# Patient Record
Sex: FEMALE | Race: WHITE
[De-identification: names, ages, dates, MRNs, and addresses within clinical notes are randomized per-mention and may not be internally consistent; named-entity substitution may affect disease eponyms.]

---

## 2017-02-07 ENCOUNTER — CHARTING TRANS (OUTPATIENT)
Dept: OTHER | Age: 57
End: 2017-02-07

## 2017-02-07 ASSESSMENT — PAIN SCALES - GENERAL: PAINLEVEL_OUTOF10: 0

## 2017-03-17 PROCEDURE — 87086 URINE CULTURE/COLONY COUNT: CPT | Performed by: FAMILY MEDICINE

## 2017-08-07 PROCEDURE — 87086 URINE CULTURE/COLONY COUNT: CPT | Performed by: FAMILY MEDICINE

## 2017-11-22 PROBLEM — J45.20 MILD INTERMITTENT ASTHMA WITHOUT COMPLICATION (HCC): Status: ACTIVE | Noted: 2017-11-22

## 2017-11-22 PROBLEM — J45.20 MILD INTERMITTENT ASTHMA WITHOUT COMPLICATION: Status: ACTIVE | Noted: 2017-11-22

## 2018-01-16 ENCOUNTER — APPOINTMENT (OUTPATIENT)
Dept: LAB | Age: 58
End: 2018-01-16
Attending: DERMATOLOGY
Payer: COMMERCIAL

## 2018-01-16 PROCEDURE — 87186 SC STD MICRODIL/AGAR DIL: CPT | Performed by: DERMATOLOGY

## 2018-01-16 PROCEDURE — 87205 SMEAR GRAM STAIN: CPT | Performed by: DERMATOLOGY

## 2018-01-16 PROCEDURE — 87070 CULTURE OTHR SPECIMN AEROBIC: CPT | Performed by: DERMATOLOGY

## 2018-01-16 PROCEDURE — 87147 CULTURE TYPE IMMUNOLOGIC: CPT | Performed by: DERMATOLOGY

## 2018-02-05 ENCOUNTER — HOSPITAL ENCOUNTER (EMERGENCY)
Facility: HOSPITAL | Age: 58
Discharge: HOME | End: 2018-02-05
Payer: COMMERCIAL

## 2018-02-05 ENCOUNTER — APPOINTMENT (OUTPATIENT)
Dept: ULTRASOUND IMAGING | Facility: HOSPITAL | Age: 58
End: 2018-02-05
Attending: PHYSICIAN ASSISTANT
Payer: COMMERCIAL

## 2018-02-05 ENCOUNTER — APPOINTMENT (OUTPATIENT)
Dept: CT IMAGING | Facility: HOSPITAL | Age: 58
End: 2018-02-05
Attending: PHYSICIAN ASSISTANT
Payer: COMMERCIAL

## 2018-02-05 ENCOUNTER — APPOINTMENT (OUTPATIENT)
Dept: GENERAL RADIOLOGY | Facility: HOSPITAL | Age: 58
End: 2018-02-05
Payer: COMMERCIAL

## 2018-02-05 VITALS
DIASTOLIC BLOOD PRESSURE: 77 MMHG | SYSTOLIC BLOOD PRESSURE: 130 MMHG | BODY MASS INDEX: 28.35 KG/M2 | HEART RATE: 65 BPM | RESPIRATION RATE: 16 BRPM | OXYGEN SATURATION: 98 % | WEIGHT: 160 LBS | TEMPERATURE: 98 F | HEIGHT: 63 IN

## 2018-02-05 DIAGNOSIS — M71.22 POPLITEAL CYST, LEFT: ICD-10-CM

## 2018-02-05 DIAGNOSIS — R07.9 CHEST PAIN OF UNCERTAIN ETIOLOGY: Primary | ICD-10-CM

## 2018-02-05 LAB
ALBUMIN SERPL-MCNC: 3.8 G/DL (ref 3.5–4.8)
ALP LIVER SERPL-CCNC: 76 U/L (ref 41–108)
ALT SERPL-CCNC: 34 U/L (ref 14–54)
AST SERPL-CCNC: 19 U/L (ref 15–41)
ATRIAL RATE: 63 BPM
ATRIAL RATE: 76 BPM
BASOPHILS # BLD AUTO: 0.07 X10(3) UL (ref 0–0.1)
BASOPHILS NFR BLD AUTO: 1.1 %
BILIRUB SERPL-MCNC: 0.5 MG/DL (ref 0.1–2)
BUN BLD-MCNC: 11 MG/DL (ref 8–20)
CALCIUM BLD-MCNC: 9.3 MG/DL (ref 8.3–10.3)
CHLORIDE: 107 MMOL/L (ref 101–111)
CO2: 26 MMOL/L (ref 22–32)
CREAT BLD-MCNC: 0.67 MG/DL (ref 0.55–1.02)
EOSINOPHIL # BLD AUTO: 0.2 X10(3) UL (ref 0–0.3)
EOSINOPHIL NFR BLD AUTO: 3 %
ERYTHROCYTE [DISTWIDTH] IN BLOOD BY AUTOMATED COUNT: 12.6 % (ref 11.5–16)
GLUCOSE BLD-MCNC: 101 MG/DL (ref 70–99)
HCT VFR BLD AUTO: 40.1 % (ref 34–50)
HGB BLD-MCNC: 13.3 G/DL (ref 12–16)
IMMATURE GRANULOCYTE COUNT: 0.04 X10(3) UL (ref 0–1)
IMMATURE GRANULOCYTE RATIO %: 0.6 %
LYMPHOCYTES # BLD AUTO: 2.59 X10(3) UL (ref 0.9–4)
LYMPHOCYTES NFR BLD AUTO: 39.2 %
M PROTEIN MFR SERPL ELPH: 7.4 G/DL (ref 6.1–8.3)
MCH RBC QN AUTO: 31.9 PG (ref 27–33.2)
MCHC RBC AUTO-ENTMCNC: 33.2 G/DL (ref 31–37)
MCV RBC AUTO: 96.2 FL (ref 81–100)
MONOCYTES # BLD AUTO: 0.6 X10(3) UL (ref 0.1–0.6)
MONOCYTES NFR BLD AUTO: 9.1 %
NEUTROPHIL ABS PRELIM: 3.11 X10 (3) UL (ref 1.3–6.7)
NEUTROPHILS # BLD AUTO: 3.11 X10(3) UL (ref 1.3–6.7)
NEUTROPHILS NFR BLD AUTO: 47 %
P AXIS: 44 DEGREES
P AXIS: 45 DEGREES
P-R INTERVAL: 144 MS
P-R INTERVAL: 154 MS
PLATELET # BLD AUTO: 351 10(3)UL (ref 150–450)
POTASSIUM SERPL-SCNC: 3.1 MMOL/L (ref 3.6–5.1)
Q-T INTERVAL: 394 MS
Q-T INTERVAL: 444 MS
QRS DURATION: 88 MS
QRS DURATION: 88 MS
QTC CALCULATION (BEZET): 443 MS
QTC CALCULATION (BEZET): 454 MS
R AXIS: 56 DEGREES
R AXIS: 62 DEGREES
RBC # BLD AUTO: 4.17 X10(6)UL (ref 3.8–5.1)
RED CELL DISTRIBUTION WIDTH-SD: 44.3 FL (ref 35.1–46.3)
SODIUM SERPL-SCNC: 141 MMOL/L (ref 136–144)
T AXIS: 54 DEGREES
T AXIS: 59 DEGREES
TROPONIN: <0.046 NG/ML (ref ?–0.05)
TROPONIN: <0.046 NG/ML (ref ?–0.05)
VENTRICULAR RATE: 63 BPM
VENTRICULAR RATE: 76 BPM
WBC # BLD AUTO: 6.6 X10(3) UL (ref 4–13)

## 2018-02-05 PROCEDURE — 99285 EMERGENCY DEPT VISIT HI MDM: CPT

## 2018-02-05 PROCEDURE — 93010 ELECTROCARDIOGRAM REPORT: CPT

## 2018-02-05 PROCEDURE — 84484 ASSAY OF TROPONIN QUANT: CPT | Performed by: PHYSICIAN ASSISTANT

## 2018-02-05 PROCEDURE — 80053 COMPREHEN METABOLIC PANEL: CPT

## 2018-02-05 PROCEDURE — 71045 X-RAY EXAM CHEST 1 VIEW: CPT

## 2018-02-05 PROCEDURE — 84484 ASSAY OF TROPONIN QUANT: CPT

## 2018-02-05 PROCEDURE — 36415 COLL VENOUS BLD VENIPUNCTURE: CPT

## 2018-02-05 PROCEDURE — 93005 ELECTROCARDIOGRAM TRACING: CPT

## 2018-02-05 PROCEDURE — 85025 COMPLETE CBC W/AUTO DIFF WBC: CPT

## 2018-02-05 PROCEDURE — 93971 EXTREMITY STUDY: CPT | Performed by: PHYSICIAN ASSISTANT

## 2018-02-05 PROCEDURE — 71275 CT ANGIOGRAPHY CHEST: CPT | Performed by: PHYSICIAN ASSISTANT

## 2018-02-05 RX ORDER — ASPIRIN 81 MG/1
324 TABLET, CHEWABLE ORAL ONCE
Status: COMPLETED | OUTPATIENT
Start: 2018-02-05 | End: 2018-02-05

## 2018-02-05 NOTE — ED NOTES
Pt lying on cart with eyes closed, appears comfortable. Pt denies complaints at this time. Pt is aware she is waiting for CT. Spouse at bedside.

## 2018-02-05 NOTE — ED PROVIDER NOTES
Patient Seen in: [facility] Emergency Department    History   Patient presents with:  Chest Pain Angina (cardiovascular)    Stated Complaint: chest pain    HPI    Chiara Brumfield is a 68-year-old female who comes in today with her  complaining of left- POSSIBLE POLYPECTOMY [id number];  Surgeon: Antonia Morgan MD;  Location: [address] [place] [address]  2006: CT HEART W/ CALCIUM SCORING      Comment: 0  1/13/2011: NEEDLE BIOPSY RIGHT  2012: STRESS ECHO TEST, CARDIO ([facility])      Com reflexes are normal   Left knee: + pain will ROM, no effusion or edema, negative anterior drawer and laxity, good distal pulses          ED Course     Labs Reviewed   COMP METABOLIC PANEL (14) - Abnormal; Notable for the following:        Result Value    G Technologist)  Patient has chest pain last night and soreness to the chest today. CONTRAST USED:  61cc of Omnipaque 350  FINDINGS:  VASCULATURE:  No visible pulmonary arterial thrombus or attenuation. THORACIC AORTA:  Mildly tortuous.   No aneurysm or di vein.  PATIENT STATED HISTORY: (As transcribed by Technologist)  Patient offered no additional history at this time. FINDINGS:  EXTREMITY EXAMINED:  Left lower extremity THROMBI:  None visible.  COMPRESSION:  Normal compressibility, phasicity, and augmen discharge today.       Disposition and Plan     Clinical Impression:  Chest pain of uncertain etiology  (primary encounter diagnosis)  Popliteal cyst, left    Disposition:  Discharge  2/5/2018  4:22 pm    Follow-up:  Florencia Gonzalez MD  [address] [place] [address]

## 2018-02-07 PROBLEM — L73.9 CHRONIC FOLLICULITIS: Status: ACTIVE | Noted: 2018-02-07

## 2018-02-08 PROCEDURE — 87502 INFLUENZA DNA AMP PROBE: CPT | Performed by: NURSE PRACTITIONER

## 2018-02-08 PROCEDURE — 87798 DETECT AGENT NOS DNA AMP: CPT | Performed by: NURSE PRACTITIONER

## 2018-03-02 PROBLEM — Z80.0 FAMILY HISTORY OF COLON CANCER: Status: ACTIVE | Noted: 2018-03-02

## 2018-03-02 PROCEDURE — 81001 URINALYSIS AUTO W/SCOPE: CPT | Performed by: FAMILY MEDICINE

## 2018-10-04 ENCOUNTER — CHARTING TRANS (OUTPATIENT)
Dept: OTHER | Age: 58
End: 2018-10-04

## 2018-10-04 ENCOUNTER — LAB SERVICES (OUTPATIENT)
Dept: OTHER | Age: 58
End: 2018-10-04

## 2018-10-04 LAB — RAPID STREP GROUP A: NORMAL

## 2018-11-27 VITALS
HEIGHT: 63 IN | RESPIRATION RATE: 16 BRPM | SYSTOLIC BLOOD PRESSURE: 126 MMHG | BODY MASS INDEX: 29.94 KG/M2 | WEIGHT: 168.98 LBS | TEMPERATURE: 98.2 F | DIASTOLIC BLOOD PRESSURE: 78 MMHG | HEART RATE: 80 BPM

## 2018-12-02 VITALS
RESPIRATION RATE: 18 BRPM | SYSTOLIC BLOOD PRESSURE: 132 MMHG | HEART RATE: 98 BPM | DIASTOLIC BLOOD PRESSURE: 70 MMHG | BODY MASS INDEX: 29.02 KG/M2 | TEMPERATURE: 98.5 F | HEIGHT: 64 IN | WEIGHT: 170 LBS

## 2021-09-13 ENCOUNTER — OFFICE VISIT (OUTPATIENT)
Dept: URGENT CARE | Age: 61
End: 2021-09-13

## 2021-09-13 VITALS
DIASTOLIC BLOOD PRESSURE: 78 MMHG | TEMPERATURE: 98.3 F | HEIGHT: 64 IN | WEIGHT: 170 LBS | BODY MASS INDEX: 29.02 KG/M2 | SYSTOLIC BLOOD PRESSURE: 132 MMHG | OXYGEN SATURATION: 95 % | HEART RATE: 89 BPM

## 2021-09-13 DIAGNOSIS — J02.9 SORE THROAT: Primary | ICD-10-CM

## 2021-09-13 DIAGNOSIS — J06.9 ACUTE UPPER RESPIRATORY INFECTION: ICD-10-CM

## 2021-09-13 DIAGNOSIS — R05.9 COUGH: ICD-10-CM

## 2021-09-13 LAB
INTERNAL PROCEDURAL CONTROLS ACCEPTABLE: YES
S PYO AG THROAT QL IA.RAPID: NEGATIVE
SARS-COV+SARS-COV-2 AG RESP QL IA.RAPID: NOT DETECTED

## 2021-09-13 PROCEDURE — 99213 OFFICE O/P EST LOW 20 MIN: CPT | Performed by: NURSE PRACTITIONER

## 2021-09-13 PROCEDURE — 87426 SARSCOV CORONAVIRUS AG IA: CPT | Performed by: NURSE PRACTITIONER

## 2021-09-13 PROCEDURE — 87880 STREP A ASSAY W/OPTIC: CPT | Performed by: NURSE PRACTITIONER

## 2021-09-13 RX ORDER — SERTRALINE HYDROCHLORIDE 100 MG/1
100 TABLET, FILM COATED ORAL
COMMUNITY
Start: 2021-08-30

## 2021-09-13 RX ORDER — ALBUTEROL SULFATE 90 UG/1
2 AEROSOL, METERED RESPIRATORY (INHALATION) PRN
COMMUNITY
Start: 2021-07-13

## 2021-09-13 RX ORDER — NAPROXEN 500 MG/1
TABLET ORAL
COMMUNITY
Start: 2021-05-26

## 2021-09-13 RX ORDER — FOLIC ACID 1 MG/1
1000 TABLET ORAL DAILY
COMMUNITY
Start: 2021-08-24

## 2021-09-13 RX ORDER — BENZONATATE 100 MG/1
100 CAPSULE ORAL 3 TIMES DAILY PRN
Qty: 20 CAPSULE | Refills: 0 | Status: SHIPPED | OUTPATIENT
Start: 2021-09-13 | End: 2021-10-28 | Stop reason: SDUPTHER

## 2021-09-13 RX ORDER — DEXTROAMPHETAMINE SACCHARATE, AMPHETAMINE ASPARTATE, DEXTROAMPHETAMINE SULFATE AND AMPHETAMINE SULFATE 3.75; 3.75; 3.75; 3.75 MG/1; MG/1; MG/1; MG/1
15 TABLET ORAL
COMMUNITY
Start: 2021-08-30 | End: 2021-10-28 | Stop reason: SDUPTHER

## 2021-09-13 RX ORDER — METHOTREXATE 2.5 MG/1
TABLET ORAL
COMMUNITY
Start: 2021-06-02

## 2021-09-13 RX ORDER — PANTOPRAZOLE SODIUM 40 MG/1
40 TABLET, DELAYED RELEASE ORAL
COMMUNITY
Start: 2021-06-01

## 2021-09-13 ASSESSMENT — ENCOUNTER SYMPTOMS
SINUS PRESSURE: 0
TROUBLE SWALLOWING: 0
VOMITING: 0
COUGH: 1
FATIGUE: 0
NAUSEA: 0
HEADACHES: 1
RHINORRHEA: 1
SORE THROAT: 1
FEVER: 0
CHEST TIGHTNESS: 0
CHILLS: 0
DIARRHEA: 0
DIZZINESS: 0
VOICE CHANGE: 0
LIGHT-HEADEDNESS: 0
SHORTNESS OF BREATH: 0

## 2021-09-14 PROCEDURE — U0005 INFEC AGEN DETEC AMPLI PROBE: HCPCS | Performed by: NURSE PRACTITIONER

## 2021-09-14 PROCEDURE — U0003 INFECTIOUS AGENT DETECTION BY NUCLEIC ACID (DNA OR RNA); SEVERE ACUTE RESPIRATORY SYNDROME CORONAVIRUS 2 (SARS-COV-2) (CORONAVIRUS DISEASE [COVID-19]), AMPLIFIED PROBE TECHNIQUE, MAKING USE OF HIGH THROUGHPUT TECHNOLOGIES AS DESCRIBED BY CMS-2020-01-R: HCPCS | Performed by: NURSE PRACTITIONER

## 2021-09-15 LAB
SARS-COV-2 RNA RESP QL NAA+PROBE: NOT DETECTED
SERVICE CMNT-IMP: NORMAL
SERVICE CMNT-IMP: NORMAL

## 2021-09-17 ENCOUNTER — TELEPHONE (OUTPATIENT)
Dept: SCHEDULING | Age: 61
End: 2021-09-17

## 2021-09-18 ENCOUNTER — WALK IN (OUTPATIENT)
Dept: URGENT CARE | Age: 61
End: 2021-09-18

## 2021-09-18 VITALS
OXYGEN SATURATION: 99 % | HEART RATE: 84 BPM | HEIGHT: 64 IN | SYSTOLIC BLOOD PRESSURE: 124 MMHG | BODY MASS INDEX: 29.02 KG/M2 | DIASTOLIC BLOOD PRESSURE: 74 MMHG | RESPIRATION RATE: 18 BRPM | TEMPERATURE: 98.5 F | WEIGHT: 170 LBS

## 2021-09-18 DIAGNOSIS — J01.10 ACUTE FRONTAL SINUSITIS, RECURRENCE NOT SPECIFIED: Primary | ICD-10-CM

## 2021-09-18 PROCEDURE — 99213 OFFICE O/P EST LOW 20 MIN: CPT | Performed by: NURSE PRACTITIONER

## 2021-09-18 RX ORDER — LIDOCAINE HYDROCHLORIDE 20 MG/ML
SOLUTION OROPHARYNGEAL
Qty: 120 ML | Refills: 0 | Status: SHIPPED | OUTPATIENT
Start: 2021-09-18 | End: 2021-10-28 | Stop reason: SDUPTHER

## 2021-09-18 RX ORDER — LIDOCAINE HYDROCHLORIDE 20 MG/ML
SOLUTION OROPHARYNGEAL
Qty: 120 ML | Refills: 0 | Status: SHIPPED | OUTPATIENT
Start: 2021-09-18

## 2021-09-18 RX ORDER — AMOXICILLIN AND CLAVULANATE POTASSIUM 875; 125 MG/1; MG/1
1 TABLET, FILM COATED ORAL EVERY 12 HOURS
Qty: 20 TABLET | Refills: 0 | Status: SHIPPED | OUTPATIENT
Start: 2021-09-18 | End: 2021-09-28

## 2021-09-18 RX ORDER — METHYLPREDNISOLONE 4 MG
TABLET, DOSE PACK ORAL
Qty: 21 TABLET | Refills: 0 | Status: SHIPPED | OUTPATIENT
Start: 2021-09-18 | End: 2021-10-28 | Stop reason: SDUPTHER

## 2021-09-18 ASSESSMENT — ENCOUNTER SYMPTOMS
DIZZINESS: 0
HEADACHES: 1
SHORTNESS OF BREATH: 0
CHEST TIGHTNESS: 1
FEVER: 0
FACIAL SWELLING: 1
RHINORRHEA: 1
SORE THROAT: 1
LIGHT-HEADEDNESS: 0
FATIGUE: 1
VOICE CHANGE: 0
COUGH: 1
SINUS PRESSURE: 1
TROUBLE SWALLOWING: 0
CHILLS: 0

## 2021-10-28 ENCOUNTER — WALK IN (OUTPATIENT)
Dept: URGENT CARE | Age: 61
End: 2021-10-28

## 2021-10-28 VITALS
WEIGHT: 170 LBS | DIASTOLIC BLOOD PRESSURE: 74 MMHG | OXYGEN SATURATION: 100 % | RESPIRATION RATE: 20 BRPM | TEMPERATURE: 96.4 F | SYSTOLIC BLOOD PRESSURE: 120 MMHG | HEIGHT: 64 IN | HEART RATE: 85 BPM | BODY MASS INDEX: 29.02 KG/M2

## 2021-10-28 DIAGNOSIS — J45.909 BRONCHITIS WITH ASTHMA, ACUTE: Primary | ICD-10-CM

## 2021-10-28 DIAGNOSIS — J30.9 ALLERGIC RHINITIS, UNSPECIFIED SEASONALITY, UNSPECIFIED TRIGGER: ICD-10-CM

## 2021-10-28 DIAGNOSIS — J20.9 BRONCHITIS WITH ASTHMA, ACUTE: Primary | ICD-10-CM

## 2021-10-28 PROBLEM — R73.03 PREDIABETES: Status: ACTIVE | Noted: 2021-10-28

## 2021-10-28 PROBLEM — L73.9 CHRONIC FOLLICULITIS: Status: ACTIVE | Noted: 2018-02-07

## 2021-10-28 PROBLEM — R93.1 ABNORMAL CT SCAN, HEART: Status: ACTIVE | Noted: 2021-10-28

## 2021-10-28 PROBLEM — Z92.25 PERSONAL HISTORY OF IMMUNOSUPPRESSIVE THERAPY: Status: ACTIVE | Noted: 2021-10-28

## 2021-10-28 PROBLEM — J45.20 MILD INTERMITTENT ASTHMA WITHOUT COMPLICATION: Status: ACTIVE | Noted: 2017-11-22

## 2021-10-28 PROBLEM — I10 ESSENTIAL HYPERTENSION: Status: ACTIVE | Noted: 2021-10-28

## 2021-10-28 PROBLEM — Z80.0 FAMILY HISTORY OF COLON CANCER: Status: ACTIVE | Noted: 2018-03-02

## 2021-10-28 PROBLEM — M43.10 RETROLISTHESIS OF VERTEBRAE: Status: ACTIVE | Noted: 2021-10-28

## 2021-10-28 PROBLEM — M50.20 PROTRUSION OF CERVICAL INTERVERTEBRAL DISC: Status: ACTIVE | Noted: 2021-10-28

## 2021-10-28 LAB — SARS-COV+SARS-COV-2 AG RESP QL IA.RAPID: NOT DETECTED

## 2021-10-28 PROCEDURE — U0005 INFEC AGEN DETEC AMPLI PROBE: HCPCS | Performed by: PSYCHIATRY & NEUROLOGY

## 2021-10-28 PROCEDURE — 87426 SARSCOV CORONAVIRUS AG IA: CPT | Performed by: NURSE PRACTITIONER

## 2021-10-28 PROCEDURE — U0003 INFECTIOUS AGENT DETECTION BY NUCLEIC ACID (DNA OR RNA); SEVERE ACUTE RESPIRATORY SYNDROME CORONAVIRUS 2 (SARS-COV-2) (CORONAVIRUS DISEASE [COVID-19]), AMPLIFIED PROBE TECHNIQUE, MAKING USE OF HIGH THROUGHPUT TECHNOLOGIES AS DESCRIBED BY CMS-2020-01-R: HCPCS | Performed by: PSYCHIATRY & NEUROLOGY

## 2021-10-28 PROCEDURE — 3078F DIAST BP <80 MM HG: CPT | Performed by: NURSE PRACTITIONER

## 2021-10-28 PROCEDURE — 3074F SYST BP LT 130 MM HG: CPT | Performed by: NURSE PRACTITIONER

## 2021-10-28 PROCEDURE — 99214 OFFICE O/P EST MOD 30 MIN: CPT | Performed by: NURSE PRACTITIONER

## 2021-10-28 RX ORDER — FLUTICASONE PROPIONATE 50 MCG
2 SPRAY, SUSPENSION (ML) NASAL DAILY
Qty: 16 G | Refills: 0 | Status: SHIPPED | OUTPATIENT
Start: 2021-10-28

## 2021-10-28 RX ORDER — FLUTICASONE PROPIONATE 44 MCG
2 AEROSOL WITH ADAPTER (GRAM) INHALATION 2 TIMES DAILY
Qty: 10.6 G | Refills: 0 | Status: SHIPPED | OUTPATIENT
Start: 2021-10-28

## 2021-10-28 RX ORDER — DEXTROAMPHETAMINE SACCHARATE, AMPHETAMINE ASPARTATE, DEXTROAMPHETAMINE SULFATE AND AMPHETAMINE SULFATE 3.75; 3.75; 3.75; 3.75 MG/1; MG/1; MG/1; MG/1
15 TABLET ORAL
COMMUNITY
Start: 2021-10-31 | End: 2021-11-30

## 2021-10-29 LAB
SARS-COV-2 RNA RESP QL NAA+PROBE: NOT DETECTED
SERVICE CMNT-IMP: NORMAL
SERVICE CMNT-IMP: NORMAL

## 2023-10-12 ENCOUNTER — OFFICE VISIT (OUTPATIENT)
Dept: FAMILY MEDICINE CLINIC | Facility: CLINIC | Age: 63
End: 2023-10-12
Payer: COMMERCIAL

## 2023-10-12 ENCOUNTER — LAB ENCOUNTER (OUTPATIENT)
Dept: LAB | Age: 63
End: 2023-10-12
Attending: INTERNAL MEDICINE
Payer: COMMERCIAL

## 2023-10-12 VITALS
RESPIRATION RATE: 16 BRPM | OXYGEN SATURATION: 98 % | BODY MASS INDEX: 28.59 KG/M2 | WEIGHT: 180 LBS | SYSTOLIC BLOOD PRESSURE: 140 MMHG | DIASTOLIC BLOOD PRESSURE: 90 MMHG | HEART RATE: 72 BPM | HEIGHT: 66.5 IN

## 2023-10-12 DIAGNOSIS — J45.20 MILD INTERMITTENT ASTHMA WITHOUT COMPLICATION: ICD-10-CM

## 2023-10-12 DIAGNOSIS — Z00.00 ROUTINE GENERAL MEDICAL EXAMINATION AT A HEALTH CARE FACILITY: Primary | ICD-10-CM

## 2023-10-12 DIAGNOSIS — R53.83 FATIGUE, UNSPECIFIED TYPE: ICD-10-CM

## 2023-10-12 LAB
ALBUMIN SERPL-MCNC: 4 G/DL (ref 3.4–5)
ALBUMIN/GLOB SERPL: 1.3 {RATIO} (ref 1–2)
ALP LIVER SERPL-CCNC: 79 U/L
ALT SERPL-CCNC: 28 U/L
ANION GAP SERPL CALC-SCNC: 2 MMOL/L (ref 0–18)
AST SERPL-CCNC: 21 U/L (ref 15–37)
BILIRUB SERPL-MCNC: 0.3 MG/DL (ref 0.1–2)
BUN BLD-MCNC: 13 MG/DL (ref 7–18)
CALCIUM BLD-MCNC: 9.2 MG/DL (ref 8.5–10.1)
CHLORIDE SERPL-SCNC: 111 MMOL/L (ref 98–112)
CO2 SERPL-SCNC: 26 MMOL/L (ref 21–32)
CREAT BLD-MCNC: 0.7 MG/DL
EGFRCR SERPLBLD CKD-EPI 2021: 98 ML/MIN/1.73M2 (ref 60–?)
ESTRADIOL SERPL-MCNC: 12.6 PG/ML
FASTING STATUS PATIENT QL REPORTED: NO
GLOBULIN PLAS-MCNC: 3.2 G/DL (ref 2.8–4.4)
GLUCOSE BLD-MCNC: 105 MG/DL (ref 70–99)
IRON SATN MFR SERPL: 12 %
IRON SERPL-MCNC: 58 UG/DL
OSMOLALITY SERPL CALC.SUM OF ELEC: 288 MOSM/KG (ref 275–295)
POTASSIUM SERPL-SCNC: 3.8 MMOL/L (ref 3.5–5.1)
PROT SERPL-MCNC: 7.2 G/DL (ref 6.4–8.2)
SODIUM SERPL-SCNC: 139 MMOL/L (ref 136–145)
TIBC SERPL-MCNC: 472 UG/DL (ref 240–450)
TRANSFERRIN SERPL-MCNC: 317 MG/DL (ref 200–360)
TSI SER-ACNC: 2.03 MIU/ML (ref 0.36–3.74)
VIT B12 SERPL-MCNC: 316 PG/ML (ref 193–986)

## 2023-10-12 PROCEDURE — 82607 VITAMIN B-12: CPT | Performed by: INTERNAL MEDICINE

## 2023-10-12 PROCEDURE — 99386 PREV VISIT NEW AGE 40-64: CPT | Performed by: INTERNAL MEDICINE

## 2023-10-12 PROCEDURE — 83550 IRON BINDING TEST: CPT | Performed by: INTERNAL MEDICINE

## 2023-10-12 PROCEDURE — 82670 ASSAY OF TOTAL ESTRADIOL: CPT | Performed by: INTERNAL MEDICINE

## 2023-10-12 PROCEDURE — 36415 COLL VENOUS BLD VENIPUNCTURE: CPT | Performed by: INTERNAL MEDICINE

## 2023-10-12 PROCEDURE — 83540 ASSAY OF IRON: CPT | Performed by: INTERNAL MEDICINE

## 2023-10-12 PROCEDURE — 84630 ASSAY OF ZINC: CPT | Performed by: INTERNAL MEDICINE

## 2023-10-12 PROCEDURE — 3077F SYST BP >= 140 MM HG: CPT | Performed by: INTERNAL MEDICINE

## 2023-10-12 PROCEDURE — 80053 COMPREHEN METABOLIC PANEL: CPT | Performed by: INTERNAL MEDICINE

## 2023-10-12 PROCEDURE — 3008F BODY MASS INDEX DOCD: CPT | Performed by: INTERNAL MEDICINE

## 2023-10-12 PROCEDURE — 84443 ASSAY THYROID STIM HORMONE: CPT | Performed by: INTERNAL MEDICINE

## 2023-10-12 PROCEDURE — 3080F DIAST BP >= 90 MM HG: CPT | Performed by: INTERNAL MEDICINE

## 2023-10-12 PROCEDURE — 83036 HEMOGLOBIN GLYCOSYLATED A1C: CPT | Performed by: INTERNAL MEDICINE

## 2023-10-12 PROCEDURE — 84410 TESTOSTERONE BIOAVAILABLE: CPT | Performed by: INTERNAL MEDICINE

## 2023-10-12 RX ORDER — LIDOCAINE HYDROCHLORIDE 20 MG/ML
SOLUTION OROPHARYNGEAL
COMMUNITY
Start: 2021-09-18 | End: 2023-10-12 | Stop reason: ALTCHOICE

## 2023-10-12 RX ORDER — TACROLIMUS 1 MG/G
1 OINTMENT TOPICAL NIGHTLY
COMMUNITY
Start: 2023-06-21

## 2023-10-12 RX ORDER — DOXYCYCLINE HYCLATE 100 MG
TABLET ORAL
COMMUNITY
Start: 2023-08-08 | End: 2023-10-12 | Stop reason: ALTCHOICE

## 2023-10-12 RX ORDER — KETOCONAZOLE 20 MG/G
CREAM TOPICAL
COMMUNITY
Start: 2023-09-05 | End: 2023-10-12 | Stop reason: ALTCHOICE

## 2023-10-12 RX ORDER — ALBUTEROL SULFATE 90 UG/1
2 AEROSOL, METERED RESPIRATORY (INHALATION) AS NEEDED
COMMUNITY
Start: 2021-02-17

## 2023-10-12 RX ORDER — VENLAFAXINE HYDROCHLORIDE 150 MG/1
150 CAPSULE, EXTENDED RELEASE ORAL AS DIRECTED
COMMUNITY
Start: 2021-06-01

## 2023-10-12 RX ORDER — SECUKINUMAB 150 MG/ML
INJECTION SUBCUTANEOUS
COMMUNITY
Start: 2023-09-12

## 2023-10-12 RX ORDER — CALCIPOTRIENE AND BETAMETHASONE DIPROPIONATE 50; .5 UG/G; MG/G
AEROSOL, FOAM TOPICAL
COMMUNITY
Start: 2023-06-20 | End: 2023-10-12 | Stop reason: ALTCHOICE

## 2023-10-12 RX ORDER — METHOTREXATE 2.5 MG/1
TABLET ORAL
COMMUNITY
Start: 2021-06-02

## 2023-10-12 RX ORDER — FOLIC ACID 1 MG/1
1 TABLET ORAL DAILY
COMMUNITY
Start: 2023-05-02

## 2023-10-12 RX ORDER — DEXTROAMPHETAMINE SACCHARATE, AMPHETAMINE ASPARTATE, DEXTROAMPHETAMINE SULFATE AND AMPHETAMINE SULFATE 3.75; 3.75; 3.75; 3.75 MG/1; MG/1; MG/1; MG/1
1 TABLET ORAL 2 TIMES DAILY
COMMUNITY
Start: 2023-06-08 | End: 2023-10-12 | Stop reason: ALTCHOICE

## 2023-10-12 RX ORDER — ARIPIPRAZOLE 2 MG/1
2 TABLET ORAL
COMMUNITY
Start: 2023-01-19

## 2023-10-12 RX ORDER — FLUTICASONE PROPIONATE 44 UG/1
2 AEROSOL, METERED RESPIRATORY (INHALATION) 2 TIMES DAILY
COMMUNITY
Start: 2021-10-28

## 2023-10-12 RX ORDER — HYDROXYZINE HYDROCHLORIDE 25 MG/1
25 TABLET, FILM COATED ORAL NIGHTLY PRN
COMMUNITY
Start: 2023-05-23 | End: 2023-10-12 | Stop reason: ALTCHOICE

## 2023-10-14 LAB — HGBA1C: 5.7 %

## 2023-10-19 LAB
SEX HORM BIND GLOB: 43.1 NMOL/L
TESTOST % FREE+WEAK BND: 12.9 %
TESTOST FREE+WEAK BND: 2.2 NG/DL
TESTOSTERONE TOT /MS: 16.7 NG/DL

## 2023-10-25 LAB — ZINC: 67 UG/DL

## 2023-10-30 ENCOUNTER — PATIENT MESSAGE (OUTPATIENT)
Dept: FAMILY MEDICINE CLINIC | Facility: CLINIC | Age: 63
End: 2023-10-30

## 2023-10-30 DIAGNOSIS — F32.A DEPRESSION, UNSPECIFIED DEPRESSION TYPE: ICD-10-CM

## 2023-10-30 DIAGNOSIS — F41.9 ANXIETY: ICD-10-CM

## 2023-10-30 NOTE — TELEPHONE ENCOUNTER
From: Oliverio Pedraza  To: Geo Ross  Sent: 10/30/2023 3:33 PM CDT  Subject: Meds    Hi Doctor C.,  I will be a snowbird this year. Can you refill my sertraline at 3 months script? We are leaving on November 15th.

## 2023-10-31 RX ORDER — SERTRALINE HYDROCHLORIDE 100 MG/1
50 TABLET, FILM COATED ORAL DAILY
Qty: 45 TABLET | Refills: 0 | Status: SHIPPED | OUTPATIENT
Start: 2023-10-31

## 2024-02-04 DIAGNOSIS — F41.9 ANXIETY: ICD-10-CM

## 2024-02-04 DIAGNOSIS — F32.A DEPRESSION, UNSPECIFIED DEPRESSION TYPE: ICD-10-CM

## 2024-02-05 RX ORDER — SERTRALINE HYDROCHLORIDE 100 MG/1
50 TABLET, FILM COATED ORAL DAILY
Qty: 45 TABLET | Refills: 0 | Status: SHIPPED | OUTPATIENT
Start: 2024-02-05

## 2024-02-05 NOTE — TELEPHONE ENCOUNTER
A refill request was received for:  Requested Prescriptions     Pending Prescriptions Disp Refills    SERTRALINE 100 MG Oral Tab [Pharmacy Med Name: Sertraline HCl Oral Tablet 100 MG] 45 tablet 0     Sig: TAKE 1/2 TABLET BY MOUTH EVERY DAY       Last refill date:   10-31-23    Last office visit: 10-12-23    Follow up due:  No future appointments.

## 2024-03-07 ENCOUNTER — OFFICE VISIT (OUTPATIENT)
Dept: FAMILY MEDICINE CLINIC | Facility: CLINIC | Age: 64
End: 2024-03-07
Payer: COMMERCIAL

## 2024-03-07 VITALS
BODY MASS INDEX: 26.05 KG/M2 | OXYGEN SATURATION: 100 % | RESPIRATION RATE: 16 BRPM | HEIGHT: 66.5 IN | SYSTOLIC BLOOD PRESSURE: 110 MMHG | HEART RATE: 75 BPM | DIASTOLIC BLOOD PRESSURE: 78 MMHG | WEIGHT: 164 LBS

## 2024-03-07 DIAGNOSIS — R30.0 DYSURIA: Primary | ICD-10-CM

## 2024-03-07 LAB
APPEARANCE: CLEAR
BILIRUBIN: NEGATIVE
GLUCOSE (URINE DIPSTICK): NEGATIVE MG/DL
KETONES (URINE DIPSTICK): NEGATIVE MG/DL
LEUKOCYTES: NEGATIVE
MULTISTIX LOT#: ABNORMAL NUMERIC
NITRITE, URINE: NEGATIVE
PH, URINE: 7 (ref 4.5–8)
PROTEIN (URINE DIPSTICK): NEGATIVE MG/DL
SPECIFIC GRAVITY: 1.02 (ref 1–1.03)
URINE-COLOR: YELLOW
UROBILINOGEN,SEMI-QN: 0.2 MG/DL (ref 0–1.9)

## 2024-03-07 PROCEDURE — 99213 OFFICE O/P EST LOW 20 MIN: CPT | Performed by: INTERNAL MEDICINE

## 2024-03-07 PROCEDURE — 81003 URINALYSIS AUTO W/O SCOPE: CPT | Performed by: INTERNAL MEDICINE

## 2024-03-07 RX ORDER — CEFDINIR 300 MG/1
300 CAPSULE ORAL 2 TIMES DAILY
Qty: 14 CAPSULE | Refills: 0 | Status: SHIPPED | OUTPATIENT
Start: 2024-03-07 | End: 2024-03-14

## 2024-03-07 NOTE — PROGRESS NOTES
HPI:   Monique Puentes is a 63 year old female who presents with urinary symptoms for a few days. Complains of + urinary frequency, +  urgency, no dysuria, +suprapubic pressure.   Denies back pain.  Denies hematuria.  Denies fever.    Diabetic:no  Returned from a long road trip, also had some constipation. Admits to not drinking enough water.    Current Outpatient Medications   Medication Sig Dispense Refill    SERTRALINE 100 MG Oral Tab TAKE 1/2 TABLET BY MOUTH EVERY DAY 45 tablet 0    fluticasone propionate (FLOVENT HFA) 44 MCG/ACT Inhalation Aerosol Inhale 2 puffs into the lungs 2 (two) times daily.      folic acid 1 MG Oral Tab Take 1 tablet (1 mg total) by mouth daily.      methotrexate 2.5 MG Oral Tab TAKE 6 TABLETS BY MOUTH WEEKLY ON AN EMPTY STOMACH 1 HOUR PRIOR TO MEALS OR 2 HOURS AFTER      COSENTYX SENSOREADY, 300 MG, 150 MG/ML Subcutaneous Solution Auto-injector       naproxen 500 MG Oral Tab Take 1 tablet (500 mg total) by mouth 2 (two) times daily with meals.      albuterol 108 (90 Base) MCG/ACT Inhalation Aero Soln Inhale 2 puffs into the lungs as needed. (Patient not taking: Reported on 3/7/2024)      tacrolimus 0.1 % External Ointment Apply 1 Application topically nightly. (Patient not taking: Reported on 10/12/2023)      venlafaxine  MG Oral Capsule SR 24 Hr Take 1 capsule (150 mg total) by mouth As Directed. (Patient not taking: Reported on 10/12/2023)      ARIPiprazole 2 MG Oral Tab Take 1 tablet (2 mg total) by mouth daily with food. (Patient not taking: Reported on 3/7/2024)      PANTOPRAZOLE 40 MG Oral Tab EC TAKE 1 TABLET(40 MG) BY MOUTH DAILY (Patient not taking: Reported on 3/7/2024) 90 tablet 0      Past Medical History:   Diagnosis Date    Abnormal CT scan, heart 01/01/2014    89    Asthma (HCC)     GERD     Hypercholesterolemia     HYPERLIPIDEMIA     Kidney stones     Other and unspecified hyperlipidemia 03/21/2008    OTHER DISEASES     ANKYLOSING SPONDYLOSIS    Personal history  of immunosuppressive therapy     enbrel    Prediabetes     Protrusion of cervical intervertebral disc 01/01/2017    c4-c5    PSVT (paroxysmal supraventricular tachycardia)     Pulmonary nodules 01/01/2014    4mm    Retrolisthesis of vertebrae 01/01/2017    c5-c6 and     SEASONAL ALLERGIES     Traumatic arthropathy of foot     Unspecified essential hypertension       Social History     Socioeconomic History    Marital status:    Tobacco Use    Smoking status: Never    Smokeless tobacco: Never   Substance and Sexual Activity    Alcohol use: No     Alcohol/week: 0.0 standard drinks of alcohol     Comment: 1-2 WEEKLY    Drug use: No        REVIEW OF SYSTEMS:   GENERAL HEALTH:feels well otherwise, afebrile  SKIN: denies rash  GI: denies abdominal pain;  no nausea, no vomiting  : as above;  denies unusual vaginal discharge or irritation,  denies spotting,  denies pelvic pain, no flank pain  MS:  denies myalgias    EXAM:   /78   Pulse 75   Resp 16   Ht 5' 6.5\" (1.689 m)   Wt 164 lb (74.4 kg)   LMP 03/11/2013   SpO2 100%   BMI 26.07 kg/m²   GENERAL: well developed, well nourished,in no apparent distress  SKIN: warm & dry, no rash  LUNGS: clear to auscultation, easy breathing  CV: Normal S1 S2, RRR  GI: soft, non-tender, active bowel sounds; no masses  : no suprapubic tenderness on exam,  non-distended bladder,  no CVA tenderness  Urine Dip: no nitrates, + leukocytes, + blood    ASSESSMENT AND PLAN:   Dysuria.  Prescribed  Cefdinir 300mg po bid x 7 days. Urine culture sent.  Instructions given on increasing fluid intake, bladder emptying before and after intercourse, and the prn use of AZO for bladder relief.  Follow up if symptoms persist or worsen.    Monique was given an opportunity to ask questions and verbalized understanding of care.

## 2024-03-18 RX ORDER — SERTRALINE HYDROCHLORIDE 25 MG/1
25 TABLET, FILM COATED ORAL DAILY
Qty: 90 TABLET | Refills: 1 | Status: SHIPPED | OUTPATIENT
Start: 2024-03-18

## 2024-04-24 ENCOUNTER — OFFICE VISIT (OUTPATIENT)
Dept: FAMILY MEDICINE CLINIC | Facility: CLINIC | Age: 64
End: 2024-04-24
Payer: COMMERCIAL

## 2024-04-24 VITALS
HEIGHT: 63 IN | DIASTOLIC BLOOD PRESSURE: 88 MMHG | TEMPERATURE: 97 F | OXYGEN SATURATION: 96 % | BODY MASS INDEX: 28.35 KG/M2 | RESPIRATION RATE: 18 BRPM | SYSTOLIC BLOOD PRESSURE: 120 MMHG | WEIGHT: 160 LBS | HEART RATE: 98 BPM

## 2024-04-24 DIAGNOSIS — J02.0 STREP THROAT: Primary | ICD-10-CM

## 2024-04-24 DIAGNOSIS — J02.9 SORE THROAT: ICD-10-CM

## 2024-04-24 LAB
CONTROL LINE PRESENT WITH A CLEAR BACKGROUND (YES/NO): YES YES/NO
KIT LOT #: NORMAL NUMERIC
STREP GRP A CUL-SCR: POSITIVE

## 2024-04-24 PROCEDURE — 87880 STREP A ASSAY W/OPTIC: CPT | Performed by: NURSE PRACTITIONER

## 2024-04-24 PROCEDURE — 99213 OFFICE O/P EST LOW 20 MIN: CPT | Performed by: NURSE PRACTITIONER

## 2024-04-24 RX ORDER — AMOXICILLIN 500 MG/1
500 CAPSULE ORAL 2 TIMES DAILY
Qty: 20 CAPSULE | Refills: 0 | Status: SHIPPED | OUTPATIENT
Start: 2024-04-24 | End: 2024-05-04

## 2024-04-24 NOTE — PROGRESS NOTES
Subjective:   Patient ID: Monique Puentes is a 63 year old female.    Patient is a 63 year old female who presents today with complaints of body aches, chills, tactile fevers, sore throat, headaches, ear popping, congestion and fatigue x 2 days. Denies runny nose, cough, n/v/d or abdominal pain. Decreased appetite, tolerating fluids. Attempted treatment prior to arrival = none. No ill contacts in the home. Home COVID negative yesterday.        History/Other:   Review of Systems   Constitutional:  Positive for appetite change, chills, fatigue and fever.   HENT:  Positive for congestion and sore throat. Negative for ear pain (popping/plugged) and rhinorrhea.    Respiratory:  Negative for cough.    Gastrointestinal:  Negative for abdominal pain, diarrhea, nausea and vomiting.   Musculoskeletal:  Positive for myalgias.   Neurological:  Positive for headaches.     Current Outpatient Medications   Medication Sig Dispense Refill    amoxicillin 500 MG Oral Cap Take 1 capsule (500 mg total) by mouth 2 (two) times daily for 10 days. 20 capsule 0    sertraline 25 MG Oral Tab Take 1 tablet (25 mg total) by mouth daily. 90 tablet 1    sertraline 50 MG Oral Tab Take 1 tablet (50 mg total) by mouth daily. 90 tablet 1    albuterol 108 (90 Base) MCG/ACT Inhalation Aero Soln Inhale 2 puffs into the lungs as needed. (Patient not taking: Reported on 3/7/2024)      fluticasone propionate (FLOVENT HFA) 44 MCG/ACT Inhalation Aerosol Inhale 2 puffs into the lungs 2 (two) times daily.      folic acid 1 MG Oral Tab Take 1 tablet (1 mg total) by mouth daily.      tacrolimus 0.1 % External Ointment Apply 1 Application topically nightly. (Patient not taking: Reported on 10/12/2023)      methotrexate 2.5 MG Oral Tab TAKE 6 TABLETS BY MOUTH WEEKLY ON AN EMPTY STOMACH 1 HOUR PRIOR TO MEALS OR 2 HOURS AFTER      COSENTYX SENSOREADY, 300 MG, 150 MG/ML Subcutaneous Solution Auto-injector       ARIPiprazole 2 MG Oral Tab Take 1 tablet (2 mg total) by  mouth daily with food. (Patient not taking: Reported on 3/7/2024)      PANTOPRAZOLE 40 MG Oral Tab EC TAKE 1 TABLET(40 MG) BY MOUTH DAILY (Patient not taking: Reported on 3/7/2024) 90 tablet 0    naproxen 500 MG Oral Tab Take 1 tablet (500 mg total) by mouth 2 (two) times daily with meals.       Allergies:  Allergies   Allergen Reactions    Sulfa Drugs Cross Reactors HIVES    Codeine ITCHING    Opioid Analgesics ITCHING     /88   Pulse 98   Temp 97.2 °F (36.2 °C)   Resp 18   Ht 5' 3\" (1.6 m)   Wt 160 lb (72.6 kg)   LMP 03/11/2013   SpO2 96%   BMI 28.34 kg/m²     Objective:   Physical Exam  Vitals reviewed.   Constitutional:       General: She is awake. She is not in acute distress.     Appearance: Normal appearance. She is well-developed and well-groomed. She is not ill-appearing, toxic-appearing or diaphoretic.   HENT:      Head: Normocephalic and atraumatic.      Right Ear: Tympanic membrane, ear canal and external ear normal.      Left Ear: Tympanic membrane, ear canal and external ear normal.      Nose: Nose normal.      Mouth/Throat:      Lips: Pink.      Mouth: Mucous membranes are moist. No oral lesions.      Pharynx: Oropharynx is clear. Uvula midline. Posterior oropharyngeal erythema present.   Cardiovascular:      Rate and Rhythm: Normal rate and regular rhythm.      Heart sounds: Normal heart sounds.   Pulmonary:      Effort: Pulmonary effort is normal. No respiratory distress.      Breath sounds: Normal breath sounds and air entry. No decreased breath sounds, wheezing, rhonchi or rales.   Lymphadenopathy:      Cervical: No cervical adenopathy.   Skin:     General: Skin is warm and dry.   Neurological:      Mental Status: She is alert and oriented to person, place, and time.   Psychiatric:         Behavior: Behavior is cooperative.         Assessment & Plan:   1. Strep throat    2. Sore throat        Orders Placed This Encounter   Procedures    Strep A Assay W/Optic     Results for orders  placed or performed in visit on 04/24/24   Strep A Assay W/Optic    Collection Time: 04/24/24 10:31 AM   Result Value Ref Range    Strep Grp A Screen positive Negative    Control Line Present with a clear background (yes/no) yes Yes/No    Kit Lot # [id number] Numeric    Kit Expiration Date 3/1/25 Date       Meds This Visit:  Requested Prescriptions     Signed Prescriptions Disp Refills    amoxicillin 500 MG Oral Cap 20 capsule 0     Sig: Take 1 capsule (500 mg total) by mouth 2 (two) times daily for 10 days.     Reviewed POC test results with patient.  Reassuring physical exam findings. Vitals WNL. No sign of RDS or dehydration at this time.  START Amoxicillin today. Patient notes she hasn't taken her Methotrexate or other meds in the past few days due to not feeling well.  Supportive care and return to care measures reviewed.  Patient v/u and is comfortable with this plan.    Patient Instructions   1. Take Amoxicillin antibiotic as directed.    2. You are still contagious for 24 hours following the first dose of antibiotics.    3. Perform good hand hygiene often.  4. Change your toothbrush in 2-3 days.  5. Follow up with primary care physician as needed, recommend a follow up within 2 weeks  6. You may use throat lozenges, acetaminophen, or ibuprofen for pain and fever.    7. Gargling with warm salt water may ease your pain for a few hours. You may also drink warmed or salty liquids such as broth, or tea (if of age. No honey under 12 months old).   8. Seek medical attention sooner for worsening of symptoms despite treatment efforts, or the emergency room for the following non inclusive list of symptoms: uncontrolled fever/pain, inability to keep fluids down, shortness of breath or respiratory distress

## 2024-04-24 NOTE — PATIENT INSTRUCTIONS
1. Take Amoxicillin antibiotic as directed.    2. You are still contagious for 24 hours following the first dose of antibiotics.    3. Perform good hand hygiene often.  4. Change your toothbrush in 2-3 days.  5. Follow up with primary care physician as needed, recommend a follow up within 2 weeks  6. You may use throat lozenges, acetaminophen, or ibuprofen for pain and fever.    7. Gargling with warm salt water may ease your pain for a few hours. You may also drink warmed or salty liquids such as broth, or tea (if of age. No honey under 12 months old).   8. Seek medical attention sooner for worsening of symptoms despite treatment efforts, or the emergency room for the following non inclusive list of symptoms: uncontrolled fever/pain, inability to keep fluids down, shortness of breath or respiratory distress

## 2024-05-06 ENCOUNTER — OFFICE VISIT (OUTPATIENT)
Dept: FAMILY MEDICINE CLINIC | Facility: CLINIC | Age: 64
End: 2024-05-06
Payer: COMMERCIAL

## 2024-05-06 VITALS
BODY MASS INDEX: 28 KG/M2 | RESPIRATION RATE: 16 BRPM | TEMPERATURE: 98 F | HEART RATE: 76 BPM | DIASTOLIC BLOOD PRESSURE: 86 MMHG | OXYGEN SATURATION: 99 % | WEIGHT: 160 LBS | HEIGHT: 63.5 IN | SYSTOLIC BLOOD PRESSURE: 138 MMHG

## 2024-05-06 DIAGNOSIS — J02.9 SORE THROAT: ICD-10-CM

## 2024-05-06 DIAGNOSIS — J02.0 STREP THROAT: Primary | ICD-10-CM

## 2024-05-06 LAB
CONTROL LINE PRESENT WITH A CLEAR BACKGROUND (YES/NO): YES YES/NO
KIT LOT #: NORMAL NUMERIC

## 2024-05-06 PROCEDURE — 99213 OFFICE O/P EST LOW 20 MIN: CPT | Performed by: NURSE PRACTITIONER

## 2024-05-06 PROCEDURE — 87880 STREP A ASSAY W/OPTIC: CPT | Performed by: NURSE PRACTITIONER

## 2024-05-06 RX ORDER — AMOXICILLIN AND CLAVULANATE POTASSIUM 875; 125 MG/1; MG/1
1 TABLET, FILM COATED ORAL 2 TIMES DAILY
Qty: 20 TABLET | Refills: 0 | Status: SHIPPED | OUTPATIENT
Start: 2024-05-06 | End: 2024-05-16

## 2024-05-06 NOTE — PROGRESS NOTES
CHIEF COMPLAINT:     Chief Complaint   Patient presents with    Sore Throat     Finished antibiotics 5/3, sore throat started again yesterday, ears clogged, OTC cough drops       HPI:   Monique Puentes is a 63 year old female presents to clinic with symptoms of sore throat. Patient has had for 2 days. Symptoms have been worsening since onset.  Patient reports following associated symptoms: sore throat. Patient denies headache. Patient denies stomach upset. Patient denies rash.  Patient reports history of strep. Patient denies strep pharyngitis exposure.  Treating symptoms with: was on amoxicillin finished Friday and then symptoms started to worsen again.      Current Outpatient Medications   Medication Sig Dispense Refill    amoxicillin clavulanate 875-125 MG Oral Tab Take 1 tablet by mouth 2 (two) times daily for 10 days. 20 tablet 0    sertraline 25 MG Oral Tab Take 1 tablet (25 mg total) by mouth daily. 90 tablet 1    sertraline 50 MG Oral Tab Take 1 tablet (50 mg total) by mouth daily. 90 tablet 1    albuterol 108 (90 Base) MCG/ACT Inhalation Aero Soln Inhale 2 puffs into the lungs as needed. (Patient not taking: Reported on 3/7/2024)      fluticasone propionate (FLOVENT HFA) 44 MCG/ACT Inhalation Aerosol Inhale 2 puffs into the lungs 2 (two) times daily.      folic acid 1 MG Oral Tab Take 1 tablet (1 mg total) by mouth daily.      tacrolimus 0.1 % External Ointment Apply 1 Application topically nightly. (Patient not taking: Reported on 10/12/2023)      methotrexate 2.5 MG Oral Tab TAKE 6 TABLETS BY MOUTH WEEKLY ON AN EMPTY STOMACH 1 HOUR PRIOR TO MEALS OR 2 HOURS AFTER      COSENTYX SENSOREADY, 300 MG, 150 MG/ML Subcutaneous Solution Auto-injector       ARIPiprazole 2 MG Oral Tab Take 1 tablet (2 mg total) by mouth daily with food. (Patient not taking: Reported on 3/7/2024)      PANTOPRAZOLE 40 MG Oral Tab EC TAKE 1 TABLET(40 MG) BY MOUTH DAILY (Patient not taking: Reported on 3/7/2024) 90 tablet 0    naproxen  500 MG Oral Tab Take 1 tablet (500 mg total) by mouth 2 (two) times daily with meals.        Past Medical History:    Abnormal CT scan, heart    89    Asthma (HCC)    GERD    Hypercholesterolemia    HYPERLIPIDEMIA    Kidney stones    Other and unspecified hyperlipidemia    OTHER DISEASES    ANKYLOSING SPONDYLOSIS    Personal history of immunosuppressive therapy    enbrel    Prediabetes    Protrusion of cervical intervertebral disc    c4-c5    PSVT (paroxysmal supraventricular tachycardia) (HCC)    Pulmonary nodules    4mm    Retrolisthesis of vertebrae    c5-c6 and     SEASONAL ALLERGIES    Traumatic arthropathy of foot    Unspecified essential hypertension      Social History:  Social History     Socioeconomic History    Marital status:    Tobacco Use    Smoking status: Never    Smokeless tobacco: Never   Substance and Sexual Activity    Alcohol use: No     Alcohol/week: 0.0 standard drinks of alcohol     Comment: 1-2 WEEKLY    Drug use: No        REVIEW OF SYSTEMS:   GENERAL HEALTH:  See HPI  SKIN: see HPI  HEENT: denies ear pain, See HPI  RESPIRATORY: denies shortness of breath, or wheezing  CARDIOVASCULAR: denies chest pain, palpitations   GI: denies abdominal pain, constipation and diarrhea  NEURO: denies dizziness or lightheadedness    EXAM:   /86   Pulse 76   Temp 98.1 °F (36.7 °C)   Resp 16   Ht 5' 3.5\" (1.613 m)   Wt 160 lb (72.6 kg)   LMP 03/11/2013   SpO2 99%   BMI 27.90 kg/m²   GENERAL: well developed, well nourished,in no apparent distress  SKIN: no rashes,no suspicious lesions  HEAD: atraumatic, normocephalic  EYES: conjunctiva clear, EOM intact  EARS: TM's clear, non-injected, no bulging, retraction, or fluid  NOSE: nostrils patent, no exudates, nasal mucosa pink and noninflamed  THROAT: oral mucosa pink, moist. Posterior pharynx erythematous and injected. no exudates. Tonsils 0/4.  Breath not malodorous. No uvular deviation. No drooling.  NECK: supple  LUNGS: clear to  auscultation bilaterally, no wheezes or rhonchi. Breathing is non labored.  CARDIO: RRR without murmur  GI: good BS's,no masses, hepatosplenomegaly, or tenderness on direct palpation  EXTREMITIES: no cyanosis or edema  LYMPH: pos anterior cervical. no submandibular lymphadenopathy.  No posterior cervical or occipital lymphadenopathy.    Recent Results (from the past 24 hour(s))   Strep A Assay W/Optic    Collection Time: 05/06/24 10:17 AM   Result Value Ref Range    Strep Grp A Screen pos Negative    Control Line Present with a clear background (yes/no) yes Yes/No    Kit Lot # [id number] Numeric    Kit Expiration Date 5/21/25 Date         ASSESSMENT AND PLAN:   Assessment:   Encounter Diagnoses   Name Primary?    Sore throat     Strep throat Yes         Plan:  Comfort Measures discussed and listed in Patient Instructions. Prescription: as below.     Requested Prescriptions     Signed Prescriptions Disp Refills    amoxicillin clavulanate 875-125 MG Oral Tab 20 tablet 0     Sig: Take 1 tablet by mouth 2 (two) times daily for 10 days.       Risks, benefits, complications and side effects of meds discussed with patient.     OTC Tylenol/Motrin prn.   Push fluids- warm or cool liquids, whichever is soothing for patient  If treated with antibiotics, change tooth brush after on medication for 48 hours.   Warm salt water gargles 2 times per day for at least 3 days.    Do not share utensils or drinks with anyone.      Follow up with PCP if not improving, condition worsens, or fever greater than or equal to 100.4 persists for 72 hours.      The patient/parent indicates understanding of these issues and agrees to the plan.  The patient is asked to follow up with their PCP prn.

## 2024-05-10 ENCOUNTER — OFFICE VISIT (OUTPATIENT)
Dept: FAMILY MEDICINE CLINIC | Facility: CLINIC | Age: 64
End: 2024-05-10
Payer: COMMERCIAL

## 2024-05-10 VITALS
DIASTOLIC BLOOD PRESSURE: 74 MMHG | BODY MASS INDEX: 28 KG/M2 | HEART RATE: 74 BPM | SYSTOLIC BLOOD PRESSURE: 116 MMHG | HEIGHT: 63.5 IN | WEIGHT: 160 LBS | OXYGEN SATURATION: 90 %

## 2024-05-10 DIAGNOSIS — J02.0 STREP PHARYNGITIS: Primary | ICD-10-CM

## 2024-05-10 PROCEDURE — 99213 OFFICE O/P EST LOW 20 MIN: CPT | Performed by: FAMILY MEDICINE

## 2024-05-10 RX ORDER — FLUTICASONE PROPIONATE 50 MCG
2 SPRAY, SUSPENSION (ML) NASAL DAILY
Qty: 1 EACH | Refills: 1 | Status: SHIPPED | OUTPATIENT
Start: 2024-05-10

## 2024-05-10 NOTE — PROGRESS NOTES
Subjective:   Patient ID: Monique Puentes is a 63 year old female.    Strep throat, recurrent.  No F/C.  + sweats still last night.  Sore throat improving.  B/l ear pressure and muffled hearing.  + nasal congestion. No sinus pain.  No cough.  Now on augmentin x 4 days        History/Other:   Review of Systems   All other systems reviewed and are negative.    Current Outpatient Medications   Medication Sig Dispense Refill    fluticasone propionate 50 MCG/ACT Nasal Suspension 2 sprays by Each Nare route daily. 1 each 1    amoxicillin clavulanate 875-125 MG Oral Tab Take 1 tablet by mouth 2 (two) times daily for 10 days. 20 tablet 0    sertraline 25 MG Oral Tab Take 1 tablet (25 mg total) by mouth daily. 90 tablet 1    sertraline 50 MG Oral Tab Take 1 tablet (50 mg total) by mouth daily. 90 tablet 1    albuterol 108 (90 Base) MCG/ACT Inhalation Aero Soln Inhale 2 puffs into the lungs as needed. (Patient not taking: Reported on 3/7/2024)      fluticasone propionate (FLOVENT HFA) 44 MCG/ACT Inhalation Aerosol Inhale 2 puffs into the lungs 2 (two) times daily.      folic acid 1 MG Oral Tab Take 1 tablet (1 mg total) by mouth daily.      tacrolimus 0.1 % External Ointment Apply 1 Application topically nightly. (Patient not taking: Reported on 10/12/2023)      methotrexate 2.5 MG Oral Tab TAKE 6 TABLETS BY MOUTH WEEKLY ON AN EMPTY STOMACH 1 HOUR PRIOR TO MEALS OR 2 HOURS AFTER      COSENTYX SENSOREADY, 300 MG, 150 MG/ML Subcutaneous Solution Auto-injector       ARIPiprazole 2 MG Oral Tab Take 1 tablet (2 mg total) by mouth daily with food. (Patient not taking: Reported on 3/7/2024)      PANTOPRAZOLE 40 MG Oral Tab EC TAKE 1 TABLET(40 MG) BY MOUTH DAILY (Patient not taking: Reported on 3/7/2024) 90 tablet 0    naproxen 500 MG Oral Tab Take 1 tablet (500 mg total) by mouth 2 (two) times daily with meals.       Allergies:  Allergies   Allergen Reactions    Sulfa Drugs Cross Reactors HIVES    Codeine ITCHING    Opioid  Analgesics ITCHING       Objective:   Physical Exam  Vitals reviewed.   Constitutional:       General: She is not in acute distress.     Appearance: She is well-developed. She is not diaphoretic.   HENT:      Mouth/Throat:      Comments: Minimal redness, no pus.  Eyes:      General: No scleral icterus.        Right eye: No discharge.         Left eye: No discharge.      Conjunctiva/sclera: Conjunctivae normal.   Cardiovascular:      Rate and Rhythm: Normal rate and regular rhythm.      Heart sounds: Normal heart sounds. No murmur heard.     No friction rub. No gallop.   Pulmonary:      Effort: Pulmonary effort is normal. No respiratory distress.      Breath sounds: Normal breath sounds. No wheezing or rales.   Lymphadenopathy:      Cervical: Cervical adenopathy present.         Assessment & Plan:   1. Strep pharyngitis      1. Strep pharyngitis  - fluticasone propionate 50 MCG/ACT Nasal Suspension; 2 sprays by Each Nare route daily.  Dispense: 1 each; Refill: 1      Meds This Visit:  Requested Prescriptions     Signed Prescriptions Disp Refills    fluticasone propionate 50 MCG/ACT Nasal Suspension 1 each 1     Si sprays by Each Nare route daily.       Imaging & Referrals:  None

## 2024-05-20 ENCOUNTER — OFFICE VISIT (OUTPATIENT)
Dept: FAMILY MEDICINE CLINIC | Facility: CLINIC | Age: 64
End: 2024-05-20

## 2024-05-20 ENCOUNTER — LAB ENCOUNTER (OUTPATIENT)
Dept: LAB | Age: 64
End: 2024-05-20
Attending: INTERNAL MEDICINE

## 2024-05-20 VITALS
HEART RATE: 74 BPM | RESPIRATION RATE: 20 BRPM | DIASTOLIC BLOOD PRESSURE: 74 MMHG | HEIGHT: 63.5 IN | BODY MASS INDEX: 28 KG/M2 | WEIGHT: 160 LBS | SYSTOLIC BLOOD PRESSURE: 117 MMHG | OXYGEN SATURATION: 98 %

## 2024-05-20 DIAGNOSIS — R07.0 CHRONIC THROAT PAIN: ICD-10-CM

## 2024-05-20 DIAGNOSIS — R09.81 CHRONIC NASAL CONGESTION: ICD-10-CM

## 2024-05-20 DIAGNOSIS — J02.9 SORE THROAT: Primary | ICD-10-CM

## 2024-05-20 DIAGNOSIS — G89.29 CHRONIC THROAT PAIN: ICD-10-CM

## 2024-05-20 DIAGNOSIS — J06.9 VIRAL UPPER RESPIRATORY TRACT INFECTION: ICD-10-CM

## 2024-05-20 LAB
BASOPHILS # BLD AUTO: 0.12 X10(3) UL (ref 0–0.2)
BASOPHILS NFR BLD AUTO: 1.3 %
CONTROL LINE PRESENT WITH A CLEAR BACKGROUND (YES/NO): YES YES/NO
EOSINOPHIL # BLD AUTO: 1.06 X10(3) UL (ref 0–0.7)
EOSINOPHIL NFR BLD AUTO: 11.9 %
ERYTHROCYTE [DISTWIDTH] IN BLOOD BY AUTOMATED COUNT: 14.2 %
HCT VFR BLD AUTO: 42.4 %
HGB BLD-MCNC: 13.2 G/DL
IMM GRANULOCYTES # BLD AUTO: 0.02 X10(3) UL (ref 0–1)
IMM GRANULOCYTES NFR BLD: 0.2 %
KIT LOT #: NORMAL NUMERIC
LYMPHOCYTES # BLD AUTO: 2.67 X10(3) UL (ref 1–4)
LYMPHOCYTES NFR BLD AUTO: 30 %
MCH RBC QN AUTO: 30.1 PG (ref 26–34)
MCHC RBC AUTO-ENTMCNC: 31.1 G/DL (ref 31–37)
MCV RBC AUTO: 96.6 FL
MONOCYTES # BLD AUTO: 0.7 X10(3) UL (ref 0.1–1)
MONOCYTES NFR BLD AUTO: 7.9 %
NEUTROPHILS # BLD AUTO: 4.32 X10 (3) UL (ref 1.5–7.7)
NEUTROPHILS # BLD AUTO: 4.32 X10(3) UL (ref 1.5–7.7)
NEUTROPHILS NFR BLD AUTO: 48.7 %
PLATELET # BLD AUTO: 404 10(3)UL (ref 150–450)
RBC # BLD AUTO: 4.39 X10(6)UL
WBC # BLD AUTO: 8.9 X10(3) UL (ref 4–11)

## 2024-05-20 PROCEDURE — 85025 COMPLETE CBC W/AUTO DIFF WBC: CPT | Performed by: INTERNAL MEDICINE

## 2024-05-20 PROCEDURE — 36415 COLL VENOUS BLD VENIPUNCTURE: CPT | Performed by: INTERNAL MEDICINE

## 2024-05-20 RX ORDER — PREDNISONE 20 MG/1
TABLET ORAL
Qty: 10 TABLET | Refills: 0 | Status: SHIPPED | OUTPATIENT
Start: 2024-05-20

## 2024-05-20 NOTE — PROGRESS NOTES
Monique Puentes is a 63 year old female.  HPI:   Here for sore throat that won't go away.   April 20th ST.  1mg daily of Amoxicillin for strep throat.  Went back to urgent care after 10 days with a raging ST.  Subsequent swabs negative.    Dr. Eisenberg, was still on Augmentin 875mg BID, finished last Wednesday.  Ears are both - under water, swimming, can't hear.   Cold chills, clammy with activity.   Has not been taking her Methotrexate or injections since April 20th.   Takes acidophilus daily.  Clear drainage, if any.  + sneezing  Current Outpatient Medications   Medication Sig Dispense Refill    fluticasone propionate 50 MCG/ACT Nasal Suspension 2 sprays by Each Nare route daily. 1 each 1    sertraline 25 MG Oral Tab Take 1 tablet (25 mg total) by mouth daily. 90 tablet 1    sertraline 50 MG Oral Tab Take 1 tablet (50 mg total) by mouth daily. 90 tablet 1    fluticasone propionate (FLOVENT HFA) 44 MCG/ACT Inhalation Aerosol Inhale 2 puffs into the lungs 2 (two) times daily.      folic acid 1 MG Oral Tab Take 1 tablet (1 mg total) by mouth daily.      naproxen 500 MG Oral Tab Take 1 tablet (500 mg total) by mouth 2 (two) times daily with meals.      methotrexate 2.5 MG Oral Tab TAKE 6 TABLETS BY MOUTH WEEKLY ON AN EMPTY STOMACH 1 HOUR PRIOR TO MEALS OR 2 HOURS AFTER (Patient not taking: Reported on 5/20/2024)      COSENTYX SENSOREADY, 300 MG, 150 MG/ML Subcutaneous Solution Auto-injector  (Patient not taking: Reported on 5/20/2024)        Past Medical History:    Abnormal CT scan, heart    92    Asthma (HCC)    GERD    Hypercholesterolemia    HYPERLIPIDEMIA    Kidney stones    Other and unspecified hyperlipidemia    OTHER DISEASES    ANKYLOSING SPONDYLOSIS    Personal history of immunosuppressive therapy    enbrel    Prediabetes    Protrusion of cervical intervertebral disc    c4-c5    PSVT (paroxysmal supraventricular tachycardia) (HCC)    Pulmonary nodules    4mm    Retrolisthesis of vertebrae    c5-c6 and      SEASONAL ALLERGIES    Traumatic arthropathy of foot    Unspecified essential hypertension      Social History:  Social History     Socioeconomic History    Marital status:    Tobacco Use    Smoking status: Never    Smokeless tobacco: Never   Substance and Sexual Activity    Alcohol use: No     Alcohol/week: 0.0 standard drinks of alcohol     Comment: 1-2 WEEKLY    Drug use: No        REVIEW OF SYSTEMS:   GENERAL HEALTH: feels well otherwise; unknown fevers  ENT: nasal congestion, swollen lymph nodes are a little better but 'not right', left side of throat something there; clear watery drainage. No sinus pain. + ST worse in the morning until lunchtime, using [place]  SKIN: denies any unusual skin lesions or rashes  RESPIRATORY: denies shortness of breath; cough only with talking too much, feels 'taxed', less endurance  CARDIOVASCULAR: denies chest pain or pressure  GI: denies N/V/D; denies abdominal pain    : denies dysuria, frequency or incontinence  NEURO: +  headaches, denies dizziness; denies numbness or tingling    EXAM:   LMP 03/11/2013   GENERAL: well developed, well nourished,in no apparent distress  SKIN: warm & dry  HEENT: atraumatic, normocephalic, TMs clear, throat clear- no redness, exudate or redness  NECK: supple,no adenopathy   LUNGS: CTA, easy breathing  CV: normal S1S2, RRR without murmur  GI: good BS's,no masses, HSM or tenderness  EXT: no edema     ASSESSMENT AND PLAN:   1. Sore throat  - does not appear as bacterial  - Rapid Strep  - CBC With Differential With Platelet  - Aerobic Bacterial Culture  - Scan slide  - appears as allergy at this time    2. Viral upper respiratory tract infection  - CBC With Differential With Platelet  - Scan slide    3. Chronic nasal congestion  - predniSONE 20 MG Oral Tab; 2 tabs po each morning  x 5  days. Take with food.  Dispense: 10 tablet; Refill: 0    4. Chronic throat pain  - Aerobic Bacterial Culture        The patient indicates understanding of  these issues and agrees to the plan.  Follow up 1 month.

## 2024-06-03 ENCOUNTER — HOSPITAL ENCOUNTER (EMERGENCY)
Facility: HOSPITAL | Age: 64
Discharge: HOME | End: 2024-06-04
Attending: EMERGENCY MEDICINE
Payer: COMMERCIAL

## 2024-06-03 ENCOUNTER — APPOINTMENT (OUTPATIENT)
Dept: CT IMAGING | Facility: HOSPITAL | Age: 64
End: 2024-06-03
Attending: EMERGENCY MEDICINE
Payer: COMMERCIAL

## 2024-06-03 DIAGNOSIS — N28.1 CYST OF LEFT KIDNEY: ICD-10-CM

## 2024-06-03 DIAGNOSIS — N13.9 OBSTRUCTIVE UROPATHY: Primary | ICD-10-CM

## 2024-06-03 LAB
ALBUMIN SERPL-MCNC: 3.9 G/DL (ref 3.4–5)
ALBUMIN/GLOB SERPL: 1.1 {RATIO} (ref 1–2)
ALP LIVER SERPL-CCNC: 94 U/L
ALT SERPL-CCNC: 27 U/L
ANION GAP SERPL CALC-SCNC: 5 MMOL/L (ref 0–18)
AST SERPL-CCNC: 21 U/L (ref 15–37)
BASOPHILS # BLD AUTO: 0.1 X10(3) UL (ref 0–0.2)
BASOPHILS NFR BLD AUTO: 0.7 %
BILIRUB SERPL-MCNC: 0.2 MG/DL (ref 0.1–2)
BUN BLD-MCNC: 16 MG/DL (ref 9–23)
CALCIUM BLD-MCNC: 8.9 MG/DL (ref 8.5–10.1)
CHLORIDE SERPL-SCNC: 107 MMOL/L (ref 98–112)
CO2 SERPL-SCNC: 26 MMOL/L (ref 21–32)
CREAT BLD-MCNC: 0.89 MG/DL
EGFRCR SERPLBLD CKD-EPI 2021: 74 ML/MIN/1.73M2 (ref 60–?)
EOSINOPHIL # BLD AUTO: 0.56 X10(3) UL (ref 0–0.7)
EOSINOPHIL NFR BLD AUTO: 4.2 %
ERYTHROCYTE [DISTWIDTH] IN BLOOD BY AUTOMATED COUNT: 13.9 %
GLOBULIN PLAS-MCNC: 3.7 G/DL (ref 2.8–4.4)
GLUCOSE BLD-MCNC: 164 MG/DL (ref 70–99)
HCT VFR BLD AUTO: 40.3 %
HGB BLD-MCNC: 13.1 G/DL
IMM GRANULOCYTES # BLD AUTO: 0.08 X10(3) UL (ref 0–1)
IMM GRANULOCYTES NFR BLD: 0.6 %
LIPASE SERPL-CCNC: 43 U/L (ref 13–75)
LYMPHOCYTES # BLD AUTO: 2.36 X10(3) UL (ref 1–4)
LYMPHOCYTES NFR BLD AUTO: 17.5 %
MCH RBC QN AUTO: 30.2 PG (ref 26–34)
MCHC RBC AUTO-ENTMCNC: 32.5 G/DL (ref 31–37)
MCV RBC AUTO: 92.9 FL
MONOCYTES # BLD AUTO: 0.68 X10(3) UL (ref 0.1–1)
MONOCYTES NFR BLD AUTO: 5 %
NEUTROPHILS # BLD AUTO: 9.7 X10 (3) UL (ref 1.5–7.7)
NEUTROPHILS # BLD AUTO: 9.7 X10(3) UL (ref 1.5–7.7)
NEUTROPHILS NFR BLD AUTO: 72 %
OSMOLALITY SERPL CALC.SUM OF ELEC: 291 MOSM/KG (ref 275–295)
PLATELET # BLD AUTO: 395 10(3)UL (ref 150–450)
POTASSIUM SERPL-SCNC: 4.1 MMOL/L (ref 3.5–5.1)
PROT SERPL-MCNC: 7.6 G/DL (ref 6.4–8.2)
RBC # BLD AUTO: 4.34 X10(6)UL
SODIUM SERPL-SCNC: 138 MMOL/L (ref 136–145)
WBC # BLD AUTO: 13.5 X10(3) UL (ref 4–11)

## 2024-06-03 PROCEDURE — 74176 CT ABD & PELVIS W/O CONTRAST: CPT | Performed by: EMERGENCY MEDICINE

## 2024-06-03 PROCEDURE — 96374 THER/PROPH/DIAG INJ IV PUSH: CPT

## 2024-06-03 PROCEDURE — 99284 EMERGENCY DEPT VISIT MOD MDM: CPT

## 2024-06-03 PROCEDURE — 83690 ASSAY OF LIPASE: CPT | Performed by: EMERGENCY MEDICINE

## 2024-06-03 PROCEDURE — 96376 TX/PRO/DX INJ SAME DRUG ADON: CPT

## 2024-06-03 PROCEDURE — 99285 EMERGENCY DEPT VISIT HI MDM: CPT

## 2024-06-03 PROCEDURE — 85025 COMPLETE CBC W/AUTO DIFF WBC: CPT

## 2024-06-03 PROCEDURE — 83690 ASSAY OF LIPASE: CPT

## 2024-06-03 PROCEDURE — 85025 COMPLETE CBC W/AUTO DIFF WBC: CPT | Performed by: EMERGENCY MEDICINE

## 2024-06-03 PROCEDURE — 80053 COMPREHEN METABOLIC PANEL: CPT | Performed by: EMERGENCY MEDICINE

## 2024-06-03 PROCEDURE — 80053 COMPREHEN METABOLIC PANEL: CPT

## 2024-06-03 RX ORDER — KETOROLAC TROMETHAMINE 15 MG/ML
15 INJECTION, SOLUTION INTRAMUSCULAR; INTRAVENOUS ONCE
Status: COMPLETED | OUTPATIENT
Start: 2024-06-03 | End: 2024-06-03

## 2024-06-04 VITALS
WEIGHT: 160 LBS | OXYGEN SATURATION: 94 % | RESPIRATION RATE: 20 BRPM | DIASTOLIC BLOOD PRESSURE: 79 MMHG | HEART RATE: 73 BPM | SYSTOLIC BLOOD PRESSURE: 120 MMHG | BODY MASS INDEX: 28 KG/M2 | TEMPERATURE: 98 F | HEIGHT: 63.5 IN

## 2024-06-04 LAB
BILIRUB UR QL STRIP.AUTO: NEGATIVE
CLARITY UR REFRACT.AUTO: CLEAR
COLOR UR AUTO: YELLOW
GLUCOSE UR STRIP.AUTO-MCNC: NORMAL MG/DL
KETONES UR STRIP.AUTO-MCNC: NEGATIVE MG/DL
LEUKOCYTE ESTERASE UR QL STRIP.AUTO: 25
NITRITE UR QL STRIP.AUTO: NEGATIVE
PH UR STRIP.AUTO: 6.5 [PH] (ref 5–8)
PROT UR STRIP.AUTO-MCNC: 20 MG/DL
RBC #/AREA URNS AUTO: >10 /HPF
SP GR UR STRIP.AUTO: 1.02 (ref 1–1.03)
UROBILINOGEN UR STRIP.AUTO-MCNC: NORMAL MG/DL

## 2024-06-04 PROCEDURE — 87086 URINE CULTURE/COLONY COUNT: CPT | Performed by: EMERGENCY MEDICINE

## 2024-06-04 PROCEDURE — 96361 HYDRATE IV INFUSION ADD-ON: CPT

## 2024-06-04 PROCEDURE — 96375 TX/PRO/DX INJ NEW DRUG ADDON: CPT

## 2024-06-04 PROCEDURE — 81001 URINALYSIS AUTO W/SCOPE: CPT | Performed by: EMERGENCY MEDICINE

## 2024-06-04 RX ORDER — TAMSULOSIN HYDROCHLORIDE 0.4 MG/1
0.4 CAPSULE ORAL DAILY
Qty: 7 CAPSULE | Refills: 0 | Status: SHIPPED | OUTPATIENT
Start: 2024-06-04 | End: 2024-06-11

## 2024-06-04 RX ORDER — HYDROMORPHONE HYDROCHLORIDE 1 MG/ML
0.5 INJECTION, SOLUTION INTRAMUSCULAR; INTRAVENOUS; SUBCUTANEOUS ONCE
Status: COMPLETED | OUTPATIENT
Start: 2024-06-04 | End: 2024-06-04

## 2024-06-04 RX ORDER — CEPHALEXIN 500 MG/1
500 CAPSULE ORAL 4 TIMES DAILY
Qty: 28 CAPSULE | Refills: 0 | Status: SHIPPED | OUTPATIENT
Start: 2024-06-04 | End: 2024-06-11

## 2024-06-04 RX ORDER — HYDROCODONE BITARTRATE AND ACETAMINOPHEN 5; 325 MG/1; MG/1
1-2 TABLET ORAL EVERY 6 HOURS PRN
Qty: 14 TABLET | Refills: 0 | Status: SHIPPED | OUTPATIENT
Start: 2024-06-04 | End: 2024-06-11

## 2024-06-04 RX ORDER — CEPHALEXIN 500 MG/1
500 CAPSULE ORAL ONCE
Status: COMPLETED | OUTPATIENT
Start: 2024-06-04 | End: 2024-06-04

## 2024-06-04 RX ORDER — HYDROCODONE BITARTRATE AND ACETAMINOPHEN 5; 325 MG/1; MG/1
2 TABLET ORAL ONCE
Status: COMPLETED | OUTPATIENT
Start: 2024-06-04 | End: 2024-06-04

## 2024-06-04 NOTE — ED PROVIDER NOTES
This is a 63-year-old woman, history of  Patient Seen in: [facility] Emergency Department      History     Chief Complaint   Patient presents with    Abdomen/Flank Pain     Stated Complaint: L flank pain and abdominal pain, hx of stones    Subjective:   HPI    This is a 63-year-old woman, history of psoriatic arthritis, previous kidney stones, here for evaluation of left-sided abdominal pain started around 4 PM.  Pain is constant nothing makes better or worse.  Denies any fevers at home, denies vomiting, any other complaints.  Objective:   Past Medical History:    Abnormal CT scan, heart    92    Asthma (HCC)    GERD    Hypercholesterolemia    HYPERLIPIDEMIA    Kidney stones    Other and unspecified hyperlipidemia    OTHER DISEASES    ANKYLOSING SPONDYLOSIS    Personal history of immunosuppressive therapy    enbrel    Prediabetes    Protrusion of cervical intervertebral disc    c4-c5    PSVT (paroxysmal supraventricular tachycardia) (HCC)    Pulmonary nodules    4mm    Retrolisthesis of vertebrae    c5-c6 and     SEASONAL ALLERGIES    Traumatic arthropathy of foot    Unspecified essential hypertension              Past Surgical History:   Procedure Laterality Date    Colonoscopy  8/2015    aphthous ulcers in left colon (likely nsaid related), early diverticulosis, hemorrhoids. repeat 2025    Colonoscopy,biopsy N/A 8/11/2015    Procedure: COLONOSCOPY, POSSIBLE BIOPSY, POSSIBLE POLYPECTOMY 27453;  Surgeon: Narendra Alvarado MD;  Location: [facility], [facility]    Ct heart w/ calcium scoring  2006    0    Needle biopsy right  1/13/2011    Stress echo test, cardio ([facility])  2012    neg                Social History     Socioeconomic History    Marital status:    Tobacco Use    Smoking status: Never    Smokeless tobacco: Never   Substance and Sexual Activity    Alcohol use: Not Currently     Comment: 1-2 WEEKLY    Drug use: No              Review of Systems    Positive for stated complaint: L flank pain and  abdominal pain, hx of stones  Other systems are as noted in HPI.  Constitutional and vital signs reviewed.      All other systems reviewed and negative except as noted above.    Physical Exam     ED Triage Vitals [06/03/24 2049]   /89   Pulse 75   Resp 20   Temp 98.1 °F (36.7 °C)   Temp src Temporal   SpO2 99 %   O2 Device None (Room air)       Current Vitals:   Vital Signs  BP: 120/79  Pulse: 73  Resp: 20  Temp: 98.1 °F (36.7 °C)  Temp src: Temporal  MAP (mmHg): 93    Oxygen Therapy  SpO2: 94 %  O2 Device: None (Room air)            Physical Exam        Physical Exam  Vitals signs and nursing note reviewed.   General: Well-appearing woman sitting up in bed in no acute distress.  Head: Normocephalic and atraumatic.   HEENT:  Mucous membranes are moist.   Cardiovascular:  Normal rate and regular rhythm.  No Edema  Pulmonary:  Pulmonary effort is normal.  Normal breath sounds. No wheezing, rhonchi or rales.   Abdominal: Soft nontender nondistended, normal bowel sounds, no guarding no rebound tenderness  Skin: Warm and dry  Neurological: Awake alert, speech is normal        ED Course     Labs Reviewed   COMP METABOLIC PANEL (14) - Abnormal; Notable for the following components:       Result Value    Glucose 164 (*)     All other components within normal limits   URINALYSIS WITH CULTURE REFLEX - Abnormal; Notable for the following components:    Blood Urine 3+ (*)     Protein Urine 20 (*)     Leukocyte Esterase Urine 25 (*)     WBC Urine 11-20 (*)     RBC Urine >10 (*)     Squamous Epi. Cells Few (*)     All other components within normal limits   CBC W/ DIFFERENTIAL - Abnormal; Notable for the following components:    WBC 13.5 (*)     Neutrophil Absolute Prelim 9.70 (*)     Neutrophil Absolute 9.70 (*)     All other components within normal limits   LIPASE - Normal   CBC WITH DIFFERENTIAL WITH PLATELET    Narrative:     The following orders were created for panel order CBC With Differential With  Platelet.  Procedure                               Abnormality         Status                     ---------                               -----------         ------                     CBC W/ DIFFERENTIAL[[id number]]          Abnormal            Final result                 Please view results for these tests on the individual orders.   URINE CULTURE, ROUTINE           CT ABDOMEN+PELVIS KIDNEYSTONE 2D RNDR(NO IV,NO ORAL)(CPT=74176)    Result Date: 6/3/2024  PROCEDURE:  CT ABDOMEN+PELVIS KIDNEYSTONE 2D RNDR(NO IV,NO ORAL)(CPT=74176)  COMPARISON:  [facility] , CT, CT ABD/PLV(S) KIDNEYSTONE W/3D, 5/02/2012, 6:56 PM.  INDICATIONS:  L flank pain and abdominal pain, hx of stones  TECHNIQUE:  Unenhanced multislice CT scanning from above the kidneys to below the urinary bladder.  2D rendering are generated on the CT scanner workstation to localize potential stones in the cranio-caudal plane.  Dose reduction techniques were used. Dose information is transmitted to the ACR (American College of Radiology) NRDR (National Radiology Data Registry) which includes the Dose Index Registry.  PATIENT STATED HISTORY: (As transcribed by Technologist)  Patient has left back pain radiating to abdomen; patient has history of renal stones    FINDINGS:  KIDNEYS:  A hyperdense mass in the left kidney is noted measuring up to 2.6 cm (image 77), previously measuring up to 1.5 cm in 2012. This may represent an enlarging hemorrhagic cyst.  No additional renal lesions are identified.  Mild left hydronephrosis  is noted.  An obstructing calculus in the distal left ureter is noted measuring up to 5 millimeters (image 196).  The right kidney is within normal limits. BLADDER:  No mass, calculus or significant wall thickening. ADRENALS:  No mass or enlargement.  LIVER:  No enlargement, atrophy, abnormal density, or significant focal lesion.  BILIARY:  No visible dilatation or calcification.  PANCREAS:  No lesion, fluid collection, ductal dilatation, or  atrophy.  SPLEEN:  No enlargement or focal lesion.  AORTA/VASCULAR:  No aneurysm.  RETROPERITONEUM:  No mass or adenopathy.  BOWEL/MESENTERY:  No visible mass, obstruction, or bowel wall thickening.  Appendix is normal. ABDOMINAL WALL:  No mass or hernia.  BONES:  No bony lesion or fracture. PELVIC ORGANS:  Normal for age.  LUNG BASES:  No visible pulmonary or pleural disease.  OTHER:  Negative.             CONCLUSION:  1. Mild left hydronephrosis with an obstructing calculus at the left ureterovesicular junction measuring up to 5 millimeters. 2. Hyperdense lesion in the left kidney likely represents a hemorrhagic cyst.  This may be further evaluated with nonemergent ultrasound or contrast-enhanced MRI of the abdomen.    LOCATION:  [facility]   Dictated by (CST): Elgin Samaniego MD on 6/03/2024 at 10:39 PM     Finalized by (CST): Elgin Samaniego MD on 6/03/2024 at 10:44 PM               MDM      63-year-old woman here with left-sided abdominal pain.  Differential includes obstructing kidney stone, urinary tract infection, colitis diverticulitis.  She is accompanied by her .  CT abdomen pelvis was performed shows obstructing kidney stone 5 mm at the left UVJ.  Renal function is normal, urinalysis does show some pyuria and hematuria.  Patient's pain is controlled will cover with Keflex while urine culture is pending have her follow-up with urology for further evaluation also incidentally noted was a left-sided hyperdense lesion, seen previously on CT scan going back to 2012.  Discussed this finding with the patient she will arrange for an outpatient MRI or ultrasound with her primary doctor she will follow-up with urology for further evaluation of kidney stone understands return precautions as we have discussed and agrees with plan.          I independently viewed and interpreted the following imaging: CT abdomen pelvis without evidence of bowel obstruction                         Medical Decision  Making      Disposition and Plan     Clinical Impression:  1. Obstructive uropathy    2. Cyst of left kidney         Disposition:  Discharge  6/4/2024  1:49 am    Follow-up:  Areli Eisenberg DO  [address] [place] [address]  [facility] [id number] [phone]    Follow up  Follow-up with your PMD for reevaluation in 24 to 48 hours.  Return to ER if symptoms worsen or change or if any other new concerns.    There is a 2.6 cm cyst on your left kidney, this was present going back to 2012 however is slightly larger.  This should be further evaluated with an outpatient ultrasound or MRI over the next 2 to 4 weeks in order to better characterize this cyst.    [facility], [address] [place] [id number] [phone]  Follow up            Medications Prescribed:  Current Discharge Medication List        START taking these medications    Details   tamsulosin (FLOMAX) 0.4 MG Oral Cap Take 1 capsule (0.4 mg total) by mouth daily for 7 days.  Qty: 7 capsule, Refills: 0      HYDROcodone-acetaminophen 5-325 MG Oral Tab Take 1-2 tablets by mouth every 6 (six) hours as needed for Pain (do not take this medication prior to drinking alcohol or driving as this medication can impair your senses.). Do not drink alcohol or drive while taking this medication as it can impair your senses.  Qty: 14 tablet, Refills: 0    Associated Diagnoses: Obstructive uropathy      cephalexin 500 MG Oral Cap Take 1 capsule (500 mg total) by mouth 4 (four) times daily for 7 days.  Qty: 28 capsule, Refills: 0

## 2024-10-09 ENCOUNTER — PATIENT MESSAGE (OUTPATIENT)
Dept: FAMILY MEDICINE CLINIC | Facility: CLINIC | Age: 64
End: 2024-10-09

## 2024-10-10 ENCOUNTER — HOSPITAL ENCOUNTER (OUTPATIENT)
Dept: CT IMAGING | Age: 64
Discharge: HOME | End: 2024-10-10
Attending: INTERNAL MEDICINE
Payer: COMMERCIAL

## 2024-10-10 ENCOUNTER — OFFICE VISIT (OUTPATIENT)
Dept: FAMILY MEDICINE CLINIC | Facility: CLINIC | Age: 64
End: 2024-10-10
Payer: COMMERCIAL

## 2024-10-10 VITALS
BODY MASS INDEX: 29.75 KG/M2 | HEART RATE: 83 BPM | DIASTOLIC BLOOD PRESSURE: 84 MMHG | HEIGHT: 63.5 IN | RESPIRATION RATE: 16 BRPM | OXYGEN SATURATION: 95 % | WEIGHT: 170 LBS | SYSTOLIC BLOOD PRESSURE: 120 MMHG

## 2024-10-10 DIAGNOSIS — R30.0 DYSURIA: ICD-10-CM

## 2024-10-10 DIAGNOSIS — R10.30 LOWER ABDOMINAL PAIN: ICD-10-CM

## 2024-10-10 DIAGNOSIS — R19.7 DIARRHEA, UNSPECIFIED TYPE: ICD-10-CM

## 2024-10-10 DIAGNOSIS — R93.5 ABNORMAL CT OF THE ABDOMEN: ICD-10-CM

## 2024-10-10 DIAGNOSIS — R35.0 INCREASED FREQUENCY OF URINATION: Primary | ICD-10-CM

## 2024-10-10 LAB
APPEARANCE: CLEAR
CREAT BLD-MCNC: 0.8 MG/DL
EGFRCR SERPLBLD CKD-EPI 2021: 83 ML/MIN/1.73M2 (ref 60–?)
GLUCOSE (URINE DIPSTICK): NEGATIVE MG/DL
KETONES (URINE DIPSTICK): 40 MG/DL
MULTISTIX LOT#: ABNORMAL NUMERIC
NITRITE, URINE: NEGATIVE
PH, URINE: 6.5 (ref 4.5–8)
PROTEIN (URINE DIPSTICK): >=300 MG/DL
SPECIFIC GRAVITY: 1.02 (ref 1–1.03)
URINE-COLOR: YELLOW
UROBILINOGEN,SEMI-QN: 1 MG/DL (ref 0–1.9)

## 2024-10-10 PROCEDURE — 81003 URINALYSIS AUTO W/O SCOPE: CPT | Performed by: INTERNAL MEDICINE

## 2024-10-10 PROCEDURE — 74177 CT ABD & PELVIS W/CONTRAST: CPT | Performed by: INTERNAL MEDICINE

## 2024-10-10 PROCEDURE — 99214 OFFICE O/P EST MOD 30 MIN: CPT | Performed by: INTERNAL MEDICINE

## 2024-10-10 PROCEDURE — 82565 ASSAY OF CREATININE: CPT

## 2024-10-10 RX ORDER — HYDROCODONE BITARTRATE AND ACETAMINOPHEN 7.5; 325 MG/1; MG/1
1 TABLET ORAL EVERY 6 HOURS PRN
Qty: 15 TABLET | Refills: 0 | Status: SHIPPED | OUTPATIENT
Start: 2024-10-10

## 2024-10-10 RX ORDER — PHENAZOPYRIDINE HYDROCHLORIDE 200 MG/1
200 TABLET, FILM COATED ORAL 3 TIMES DAILY PRN
Qty: 15 TABLET | Refills: 0 | Status: CANCELLED | OUTPATIENT
Start: 2024-10-10

## 2024-10-10 RX ORDER — CIPROFLOXACIN 500 MG/1
500 TABLET, FILM COATED ORAL 2 TIMES DAILY
Qty: 14 TABLET | Refills: 0 | Status: SHIPPED | OUTPATIENT
Start: 2024-10-10 | End: 2024-10-17

## 2024-10-10 NOTE — PROGRESS NOTES
HPI:   Monique Puentes is a 64 year old female who presents with urinary and lower abdominal pain.     Symptoms since September.  Started as horrible stomach pain like 'I ate tacks' and my insides feel like they've been thru a ''.  Pain is lower abdomen on both sides.  Now if she walks, her lower stomach hurts. If she holds or presses her lower abdomen while walking, its more comfortable.  Warm water helps more than cold water.  Feels 'raw' inside.  Hasn't taken any meds for arthritis because she hasn't been eating.  Ate a bagel yesterday and had horrible (explosive) diarrhea.  Today she's only drinking water and has not had diarrhea.  No N/V  + diarrhea started first, then UTI symptoms of pressure, burning with urination  Freezing and chills but unsure about running a fever.  Formed bowel movements sometimes.    Denies upper abd pain.  Denies blood in urine or stool.  Denies vaginal spotting.   Denies radiating or traveling pain.  Colonoscopy last year, + polyps.     Current Outpatient Medications   Medication Sig Dispense Refill    fluticasone propionate 50 MCG/ACT Nasal Suspension 2 sprays by Each Nare route daily. (Patient not taking: Reported on 10/10/2024) 1 each 1    sertraline 25 MG Oral Tab Take 1 tablet (25 mg total) by mouth daily. (Patient not taking: Reported on 10/10/2024) 90 tablet 1    sertraline 50 MG Oral Tab Take 1 tablet (50 mg total) by mouth daily. (Patient not taking: Reported on 10/10/2024) 90 tablet 1    fluticasone propionate (FLOVENT HFA) 44 MCG/ACT Inhalation Aerosol Inhale 2 puffs into the lungs 2 (two) times daily. (Patient not taking: Reported on 10/10/2024)      folic acid 1 MG Oral Tab Take 1 tablet (1 mg total) by mouth daily. (Patient not taking: Reported on 10/10/2024)      methotrexate 2.5 MG Oral Tab TAKE 6 TABLETS BY MOUTH WEEKLY ON AN EMPTY STOMACH 1 HOUR PRIOR TO MEALS OR 2 HOURS AFTER (Patient not taking: Reported on 10/10/2024)      COSENTYX SENSOREADY, 300  MG, 150 MG/ML Subcutaneous Solution Auto-injector  (Patient not taking: Reported on 5/20/2024)      naproxen 500 MG Oral Tab Take 1 tablet (500 mg total) by mouth 2 (two) times daily with meals. (Patient not taking: Reported on 10/10/2024)        Past Medical History:    Abnormal CT scan, heart    92    Asthma (HCC)    GERD    Hypercholesterolemia    HYPERLIPIDEMIA    Kidney stones    Other and unspecified hyperlipidemia    OTHER DISEASES    ANKYLOSING SPONDYLOSIS    Personal history of immunosuppressive therapy    enbrel    Prediabetes    Protrusion of cervical intervertebral disc    c4-c5    PSVT (paroxysmal supraventricular tachycardia) (HCC)    Pulmonary nodules    4mm    Retrolisthesis of vertebrae    c5-c6 and     SEASONAL ALLERGIES    Traumatic arthropathy of foot    Unspecified essential hypertension      Social History     Socioeconomic History    Marital status:    Tobacco Use    Smoking status: Never    Smokeless tobacco: Never   Substance and Sexual Activity    Alcohol use: Not Currently     Comment: 1-2 WEEKLY    Drug use: No     Social Drivers of Health     Financial Resource Strain: Low Risk  (8/15/2024)    Received from [facility]    Overall Financial Resource Strain (CARDIA)     Difficulty of Paying Living Expenses: Not hard at all   Food Insecurity: No Food Insecurity (8/15/2024)    Received from [facility]    Hunger Vital Sign     Worried About Running Out of Food in the Last Year: Never true     Ran Out of Food in the Last Year: Never true   Transportation Needs: No Transportation Needs (8/15/2024)    Received from [facility]    PRAPARE - Transportation     Lack of Transportation (Medical): No     Lack of Transportation (Non-Medical): No   Housing Stability: Low Risk  (8/15/2024)    Received from [facility]    Housing Stability Vital Sign     Unable to Pay for Housing in the Last Year: No     Number of  Places Lived in the Last Year: 1     Unstable Housing in the Last Year: No        REVIEW OF SYSTEMS:   GENERAL HEALTH: in pain as above  SKIN: denies rash  GI: lower abdominal pain;  no nausea, no vomiting; eating food causes urgent diarrhea  : as above; denies unusual vaginal discharge or irritation, denies spotting, has had ovarian cysts before- this pain feels higher  MS: + myalgias but believes its due to not taking her arthritis medicine lately    EXAM:   /84   Pulse 83   Resp 16   Ht 5' 3.5\" (1.613 m)   Wt 170 lb (77.1 kg)   LMP 03/11/2013   SpO2 95%   BMI 29.64 kg/m²   GENERAL: pleasant female in mild distress due to above  SKIN: warm & dry, no rash  LUNGS: clear to auscultation, easy breathing  CV: Normal S1 S2, RRR  GI: soft abdomen, no guarding, TTP along lower abdomen bilaterally, no rebound or rigidity, no ascites, cannot deeply palpate due to pain  : + suprapubic tenderness on exam, non-distended bladder,  no CVA tenderness  Urine Dip: no  nitrates, +  leukocytes, +  blood    Abdominal/Pelvic CT, no contrast     6/03/2024  FINDINGS:    KIDNEYS:  A hyperdense mass in the left kidney is noted measuring up to 2.6 cm (image 77), previously measuring up to 1.5 cm in 2012. This may represent an enlarging hemorrhagic cyst.  No additional renal lesions are identified.  Mild left hydronephrosis   is noted.  An obstructing calculus in the distal left ureter is noted measuring up to 5 millimeters (image 196).  The right kidney is within normal limits.  BLADDER:  No mass, calculus or significant wall thickening.  ADRENALS:  No mass or enlargement.    LIVER:  No enlargement, atrophy, abnormal density, or significant focal lesion.    BILIARY:  No visible dilatation or calcification.    PANCREAS:  No lesion, fluid collection, ductal dilatation, or atrophy.    SPLEEN:  No enlargement or focal lesion.    AORTA/VASCULAR:  No aneurysm.    RETROPERITONEUM:  No mass or adenopathy.    BOWEL/MESENTERY:  No  visible mass, obstruction, or bowel wall thickening.  Appendix is normal.  ABDOMINAL WALL:  No mass or hernia.    BONES:  No bony lesion or fracture.  PELVIC ORGANS:  Normal for age.    LUNG BASES:  No visible pulmonary or pleural disease.    OTHER:  Negative.                     Impression   CONCLUSION:    1. Mild left hydronephrosis with an obstructing calculus at the left ureterovesicular junction measuring up to 5 millimeters.  2. Hyperdense lesion in the left kidney likely represents a hemorrhagic cyst.  This may be further evaluated with nonemergent ultrasound or contrast-enhanced MRI of the abdomen.           LOCATION:  [facility]        Dictated by (CST): Elgin Samaniego MD on 6/03/2024 at 10:39 PM      Finalized by (CST): Elgin Samaniego MD on 6/03/2024 at 10:44 PM       Result History    CT ABDOMEN+PELVIS KIDNEYSTONE 2D RNDR(NO IV,NO ORAL)(CPT=74176) (Order #[id number]) on 6/3/2024 - Order Result History Report  Signed by    Signed Time Phone Pager   Aden Eisenberg MD 6/03/2024 22:44 [phone]      Result Information    Status: Final result (Exam End: 6/3/2024 10:17 PM) Provider Status: Open     Exam Detai    ASSESSMENT AND PLAN:   64 year old female with acute onset of diarrhea, dysuria and lower abdominal pain for approx 1 month.  Urine cx sent.  Stat CT abdomen and pelvis with contrast ordered, CBC, CMP ordered. Testing to be done at 6:00 tonight.  Pt will be called with results and plan of care afterwards.  Discussed Colitis with UTI as possible diagnosis. Ddx of diverticulitis, though pt has diffuse lower abdominal pain with diarrhea only tied to eating.     Monique was given an opportunity to ask questions and verbalized understanding of care.

## 2024-10-11 ENCOUNTER — LAB ENCOUNTER (OUTPATIENT)
Dept: LAB | Age: 64
End: 2024-10-11
Attending: INTERNAL MEDICINE
Payer: COMMERCIAL

## 2024-10-11 LAB
ALBUMIN SERPL-MCNC: 4.8 G/DL (ref 3.2–4.8)
ALBUMIN/GLOB SERPL: 1.4 {RATIO} (ref 1–2)
ALP LIVER SERPL-CCNC: 84 U/L
ALT SERPL-CCNC: 18 U/L
ANION GAP SERPL CALC-SCNC: 7 MMOL/L (ref 0–18)
AST SERPL-CCNC: 19 U/L (ref ?–34)
BASOPHILS # BLD AUTO: 0.1 X10(3) UL (ref 0–0.2)
BASOPHILS NFR BLD AUTO: 0.9 %
BILIRUB SERPL-MCNC: 0.4 MG/DL (ref 0.2–1.1)
BUN BLD-MCNC: 11 MG/DL (ref 9–23)
CALCIUM BLD-MCNC: 10.3 MG/DL (ref 8.7–10.4)
CHLORIDE SERPL-SCNC: 103 MMOL/L (ref 98–112)
CO2 SERPL-SCNC: 26 MMOL/L (ref 21–32)
CREAT BLD-MCNC: 0.78 MG/DL
EGFRCR SERPLBLD CKD-EPI 2021: 86 ML/MIN/1.73M2 (ref 60–?)
EOSINOPHIL # BLD AUTO: 0.27 X10(3) UL (ref 0–0.7)
EOSINOPHIL NFR BLD AUTO: 2.4 %
ERYTHROCYTE [DISTWIDTH] IN BLOOD BY AUTOMATED COUNT: 13.8 %
FASTING STATUS PATIENT QL REPORTED: YES
GLOBULIN PLAS-MCNC: 3.5 G/DL (ref 2–3.5)
GLUCOSE BLD-MCNC: 100 MG/DL (ref 70–99)
HCT VFR BLD AUTO: 38.4 %
HGB BLD-MCNC: 12.2 G/DL
IMM GRANULOCYTES # BLD AUTO: 0.11 X10(3) UL (ref 0–1)
IMM GRANULOCYTES NFR BLD: 1 %
LYMPHOCYTES # BLD AUTO: 1.67 X10(3) UL (ref 1–4)
LYMPHOCYTES NFR BLD AUTO: 14.8 %
MCH RBC QN AUTO: 29 PG (ref 26–34)
MCHC RBC AUTO-ENTMCNC: 31.8 G/DL (ref 31–37)
MCV RBC AUTO: 91.4 FL
MONOCYTES # BLD AUTO: 1.5 X10(3) UL (ref 0.1–1)
MONOCYTES NFR BLD AUTO: 13.3 %
NEUTROPHILS # BLD AUTO: 7.61 X10 (3) UL (ref 1.5–7.7)
NEUTROPHILS # BLD AUTO: 7.61 X10(3) UL (ref 1.5–7.7)
NEUTROPHILS NFR BLD AUTO: 67.6 %
OSMOLALITY SERPL CALC.SUM OF ELEC: 281 MOSM/KG (ref 275–295)
PLATELET # BLD AUTO: 467 10(3)UL (ref 150–450)
POTASSIUM SERPL-SCNC: 3.5 MMOL/L (ref 3.5–5.1)
PROT SERPL-MCNC: 8.3 G/DL (ref 5.7–8.2)
RBC # BLD AUTO: 4.2 X10(6)UL
SODIUM SERPL-SCNC: 136 MMOL/L (ref 136–145)
WBC # BLD AUTO: 11.3 X10(3) UL (ref 4–11)

## 2024-10-11 PROCEDURE — 85025 COMPLETE CBC W/AUTO DIFF WBC: CPT | Performed by: INTERNAL MEDICINE

## 2024-10-11 PROCEDURE — 80053 COMPREHEN METABOLIC PANEL: CPT | Performed by: INTERNAL MEDICINE

## 2024-10-11 PROCEDURE — 36415 COLL VENOUS BLD VENIPUNCTURE: CPT | Performed by: INTERNAL MEDICINE

## 2024-10-13 ENCOUNTER — PATIENT MESSAGE (OUTPATIENT)
Dept: FAMILY MEDICINE CLINIC | Facility: CLINIC | Age: 64
End: 2024-10-13

## 2024-10-14 ENCOUNTER — TELEPHONE (OUTPATIENT)
Dept: FAMILY MEDICINE CLINIC | Facility: CLINIC | Age: 64
End: 2024-10-14

## 2024-10-14 NOTE — TELEPHONE ENCOUNTER
Sue,  See update from your Saturday message. Patient to have PET scan. Send to GI? PET scan not scheduled yet.

## 2024-10-14 NOTE — TELEPHONE ENCOUNTER
Yes, ok to send to GI for lower abdominal pain and diarrhea. She's established with  Dr. Alvarado.

## 2024-10-15 ENCOUNTER — LAB ENCOUNTER (OUTPATIENT)
Dept: LAB | Age: 64
End: 2024-10-15
Attending: INTERNAL MEDICINE
Payer: COMMERCIAL

## 2024-10-15 DIAGNOSIS — R19.7 ACUTE DIARRHEA: ICD-10-CM

## 2024-10-15 PROCEDURE — 87329 GIARDIA AG IA: CPT

## 2024-10-15 PROCEDURE — 87272 CRYPTOSPORIDIUM AG IF: CPT

## 2024-10-15 PROCEDURE — 87015 SPECIMEN INFECT AGNT CONCNTJ: CPT

## 2024-10-15 PROCEDURE — 87046 STOOL CULTR AEROBIC BACT EA: CPT

## 2024-10-15 PROCEDURE — 87209 SMEAR COMPLEX STAIN: CPT | Performed by: INTERNAL MEDICINE

## 2024-10-15 PROCEDURE — 87427 SHIGA-LIKE TOXIN AG IA: CPT

## 2024-10-15 PROCEDURE — 87338 HPYLORI STOOL AG IA: CPT | Performed by: INTERNAL MEDICINE

## 2024-10-15 PROCEDURE — 87045 FECES CULTURE AEROBIC BACT: CPT

## 2024-10-15 PROCEDURE — 87177 OVA AND PARASITES SMEARS: CPT | Performed by: INTERNAL MEDICINE

## 2024-10-16 LAB
CRYPTOSP AG STL QL IA: NEGATIVE
G LAMBLIA AG STL QL IA: NEGATIVE

## 2024-10-22 RX ORDER — SERTRALINE HYDROCHLORIDE 25 MG/1
25 TABLET, FILM COATED ORAL DAILY
Qty: 90 TABLET | Refills: 3 | Status: SHIPPED | OUTPATIENT
Start: 2024-10-22 | End: 2024-10-24

## 2024-10-23 ENCOUNTER — TELEPHONE (OUTPATIENT)
Dept: FAMILY MEDICINE CLINIC | Facility: CLINIC | Age: 64
End: 2024-10-23

## 2024-10-24 RX ORDER — SERTRALINE HYDROCHLORIDE 25 MG/1
25 TABLET, FILM COATED ORAL DAILY
COMMUNITY
Start: 2024-10-24

## 2024-10-24 NOTE — TELEPHONE ENCOUNTER
Lucy ok'd a year supply of her sertraline when she is overdue for her routine physical and med follow up. Please call her pharmacy and cancel her refills. Ok for the initial #90 tabs.  Thank you

## 2025-03-06 ENCOUNTER — OFFICE VISIT (OUTPATIENT)
Dept: FAMILY MEDICINE CLINIC | Facility: CLINIC | Age: 65
End: 2025-03-06
Payer: COMMERCIAL

## 2025-03-06 VITALS
HEART RATE: 80 BPM | DIASTOLIC BLOOD PRESSURE: 92 MMHG | SYSTOLIC BLOOD PRESSURE: 150 MMHG | RESPIRATION RATE: 16 BRPM | OXYGEN SATURATION: 98 % | WEIGHT: 165 LBS | HEIGHT: 63.5 IN | TEMPERATURE: 98 F | BODY MASS INDEX: 28.87 KG/M2

## 2025-03-06 DIAGNOSIS — J06.9 VIRAL URI WITH COUGH: Primary | ICD-10-CM

## 2025-03-06 DIAGNOSIS — M54.42 ACUTE LEFT-SIDED LOW BACK PAIN WITH LEFT-SIDED SCIATICA: ICD-10-CM

## 2025-03-06 PROCEDURE — 99213 OFFICE O/P EST LOW 20 MIN: CPT | Performed by: NURSE PRACTITIONER

## 2025-03-06 RX ORDER — ALBUTEROL SULFATE 90 UG/1
2 INHALANT RESPIRATORY (INHALATION) EVERY 4 HOURS PRN
Qty: 1 EACH | Refills: 0 | Status: SHIPPED | OUTPATIENT
Start: 2025-03-06

## 2025-03-06 RX ORDER — PREDNISONE 20 MG/1
40 TABLET ORAL DAILY
Qty: 10 TABLET | Refills: 0 | Status: SHIPPED | OUTPATIENT
Start: 2025-03-06 | End: 2025-03-11

## 2025-03-06 NOTE — PROGRESS NOTES
CHIEF COMPLAINT:     Chief Complaint   Patient presents with    Cough     4 days, nasal congestion, HA, PND, denies fever, OTC cough suppressant, mucinex       HPI:   Monique Puentes is a 64 year old female who presents for upper respiratory symptoms for  4 days. Patient reports congestion, cough with clear colored sputum, headache.  Symptoms have been consistent since onset.  Treating symptoms with OTC meds including mucinex and nyquil.  Reports is coughing constantly day and night. NO SOB, but discomfort in throat/dryness from coughing so much. No noted wheezing.   Associated symptoms include fatigue, reports also having flare up of sciatica as she was in bed resting and seemed to be 'tight\" to left lower back. Reports has hx of same.    Current Outpatient Medications   Medication Sig Dispense Refill    albuterol (PROAIR HFA) 108 (90 Base) MCG/ACT Inhalation Aero Soln Inhale 2 puffs into the lungs every 4 (four) hours as needed. 1 each 0    predniSONE 20 MG Oral Tab Take 2 tablets (40 mg total) by mouth daily for 5 days. 10 tablet 0    sertraline 25 MG Oral Tab Take 1 tablet (25 mg total) by mouth daily.      sertraline 50 MG Oral Tab Take 1 tablet (50 mg total) by mouth daily.      HYDROcodone-acetaminophen (NORCO) 7.5-325 MG Oral Tab Take 1 tablet by mouth every 6 (six) hours as needed for Pain. 15 tablet 0    fluticasone propionate 50 MCG/ACT Nasal Suspension 2 sprays by Each Nare route daily. (Patient not taking: Reported on 10/10/2024) 1 each 1    fluticasone propionate (FLOVENT HFA) 44 MCG/ACT Inhalation Aerosol Inhale 2 puffs into the lungs 2 (two) times daily. (Patient not taking: Reported on 10/10/2024)      folic acid 1 MG Oral Tab Take 1 tablet (1 mg total) by mouth daily. (Patient not taking: Reported on 10/10/2024)      methotrexate 2.5 MG Oral Tab TAKE 6 TABLETS BY MOUTH WEEKLY ON AN EMPTY STOMACH 1 HOUR PRIOR TO MEALS OR 2 HOURS AFTER (Patient not taking: Reported on 10/10/2024)      COSENTYX  SENSOREADY, 300 MG, 150 MG/ML Subcutaneous Solution Auto-injector  (Patient not taking: Reported on 5/20/2024)      naproxen 500 MG Oral Tab Take 1 tablet (500 mg total) by mouth 2 (two) times daily with meals. (Patient not taking: Reported on 10/10/2024)        Past Medical History:    Abnormal CT scan, heart    89    Asthma (HCC)    GERD    Hypercholesterolemia    HYPERLIPIDEMIA    Kidney stones    Other and unspecified hyperlipidemia    OTHER DISEASES    ANKYLOSING SPONDYLOSIS    Personal history of immunosuppressive therapy    enbrel    Prediabetes    Protrusion of cervical intervertebral disc    c4-c5    PSVT (paroxysmal supraventricular tachycardia) (HCC)    Pulmonary nodules    4mm    Retrolisthesis of vertebrae    c5-c6 and     SEASONAL ALLERGIES    Traumatic arthropathy of foot    Unspecified essential hypertension      Past Surgical History:   Procedure Laterality Date    Colonoscopy  8/2015    aphthous ulcers in left colon (likely nsaid related), early diverticulosis, hemorrhoids. repeat 2025    Colonoscopy,biopsy N/A 8/11/2015    Procedure: COLONOSCOPY, POSSIBLE BIOPSY, POSSIBLE POLYPECTOMY 18153;  Surgeon: Narendra Alvarado MD;  [facility] [place] [facility]    Ct heart w/ calcium scoring  2006    0    Needle biopsy right  1/13/2011    Stress echo test, cardio ([facility] [place])  2012    neg         Social History     Socioeconomic History    Marital status:    Tobacco Use    Smoking status: Never    Smokeless tobacco: Never   Substance and Sexual Activity    Alcohol use: Not Currently     Comment: 1-2 WEEKLY    Drug use: No     Social Drivers of Health     Food Insecurity: No Food Insecurity (8/15/2024)    Received from [facility]    Hunger Vital Sign     Worried About Running Out of Food in the Last Year: Never true     Ran Out of Food in the Last Year: Never true   Transportation Needs: No Transportation Needs (8/15/2024)    Received from [facility]     PRAPARE - Transportation     Lack of Transportation (Medical): No     Lack of Transportation (Non-Medical): No   Housing Stability: Low Risk  (8/15/2024)    Received from [facility]    Housing Stability Vital Sign     Unable to Pay for Housing in the Last Year: No     Number of Places Lived in the Last Year: 1     Unstable Housing in the Last Year: No         REVIEW OF SYSTEMS:   GENERAL: feels well otherwise,   ok appetite  SKIN: no rashes or abnormal skin lesions  HEENT: See HPI  LUNGS: denies shortness of breath or wheezing, See HPI  CARDIOVASCULAR: denies chest pain or palpitations   GI: denies N/V/C or abdominal pain  NEURO: Denies headaches    EXAM:   BP (!) 150/92   Pulse 80   Temp 97.7 °F (36.5 °C)   Resp 16   Ht 5' 3.5\" (1.613 m)   Wt 165 lb (74.8 kg)   LMP 03/11/2013   SpO2 98%   BMI 28.77 kg/m²   GENERAL: well developed, well nourished,in no apparent distress  SKIN: no rashes,no suspicious lesions  HEAD: atraumatic, normocephalic.  no tenderness on palpation of maxillary or frontal sinuses  EYES: conjunctiva clear, EOM intact  EARS: TM's pearly, no  bulging, no retraction,no fluid, bony landmarks  visualized  NOSE: Nostrils patent, no visible nasal discharge, nasal mucosa pink and moist  THROAT: Oral mucosa pink, moist. Posterior pharynx is mildly erythematous. No  exudates. Tonsils 1/4.    NECK: Supple, non-tender  LUNGS: clear to auscultation bilaterally, no wheezes or rhonchi.  No crackles/rales, good air movement throughout. Breathing is non labored.  CARDIO: RRR without murmur  EXTREMITIES: no cyanosis, clubbing or edema  LYMPH:  No cervical lymphadenopathy.        ASSESSMENT AND PLAN:   Monique Puentes is a 64 year old female who presents with     ASSESSMENT:   Encounter Diagnosis   Name Primary?    Viral URI with cough Yes       PLAN: Meds as below.  Recommend otc cough Supressant, stop mucinex and may be creating additional thin drainage which contributes to cough.  Pt. Reports needs refill on inhaler. Suspect viral URI.    Follow up with PCP if no improvement in 3-5 days, sooner if worsening.  If any sob/wheezing seek emergent care.Comfort care as described in Patient Instructions    Meds & Refills for this Visit:  Requested Prescriptions     Signed Prescriptions Disp Refills    albuterol (PROAIR HFA) 108 (90 Base) MCG/ACT Inhalation Aero Soln 1 each 0     Sig: Inhale 2 puffs into the lungs every 4 (four) hours as needed.    predniSONE 20 MG Oral Tab 10 tablet 0     Sig: Take 2 tablets (40 mg total) by mouth daily for 5 days.       Risks, benefits, and side effects of medication explained and discussed.    There are no Patient Instructions on file for this visit.    The patient indicates understanding of these issues and agrees to the plan.  The patient is asked to return if sx's persist or worsen.

## 2025-05-15 ENCOUNTER — EKG ENCOUNTER (OUTPATIENT)
Dept: LAB | Age: 65
End: 2025-05-15
Attending: INTERNAL MEDICINE
Payer: COMMERCIAL

## 2025-05-15 ENCOUNTER — LAB ENCOUNTER (OUTPATIENT)
Dept: LAB | Age: 65
End: 2025-05-15
Attending: INTERNAL MEDICINE
Payer: COMMERCIAL

## 2025-05-15 DIAGNOSIS — F90.0 ATTENTION DEFICIT HYPERACTIVITY DISORDER (ADHD), PREDOMINANTLY INATTENTIVE TYPE: ICD-10-CM

## 2025-05-15 PROCEDURE — 93010 ELECTROCARDIOGRAM REPORT: CPT | Performed by: INTERNAL MEDICINE

## 2025-05-15 PROCEDURE — 36415 COLL VENOUS BLD VENIPUNCTURE: CPT | Performed by: INTERNAL MEDICINE

## 2025-05-15 PROCEDURE — 93005 ELECTROCARDIOGRAM TRACING: CPT

## 2025-05-15 PROCEDURE — 80061 LIPID PANEL: CPT | Performed by: INTERNAL MEDICINE

## 2025-05-15 PROCEDURE — 80053 COMPREHEN METABOLIC PANEL: CPT | Performed by: INTERNAL MEDICINE

## 2025-05-16 LAB
ATRIAL RATE: 72 BPM
P AXIS: 26 DEGREES
P-R INTERVAL: 136 MS
Q-T INTERVAL: 410 MS
QRS DURATION: 84 MS
QTC CALCULATION (BEZET): 448 MS
R AXIS: 53 DEGREES
T AXIS: 54 DEGREES
VENTRICULAR RATE: 72 BPM

## 2025-06-24 ENCOUNTER — HOSPITAL ENCOUNTER (OUTPATIENT)
Dept: CT IMAGING | Age: 65
Discharge: HOME | End: 2025-06-24
Attending: FAMILY MEDICINE

## 2025-06-24 DIAGNOSIS — Z13.9 SCREENING PROCEDURE: ICD-10-CM

## 2025-08-20 ENCOUNTER — LAB ENCOUNTER (OUTPATIENT)
Dept: LAB | Age: 65
End: 2025-08-20
Attending: INTERNAL MEDICINE

## 2025-08-20 DIAGNOSIS — E55.9 VITAMIN D DEFICIENCY, UNSPECIFIED: ICD-10-CM

## 2025-08-20 LAB — VIT D+METAB SERPL-MCNC: 29.8 NG/ML (ref 30–100)

## 2025-08-20 PROCEDURE — 84443 ASSAY THYROID STIM HORMONE: CPT | Performed by: INTERNAL MEDICINE

## 2025-08-20 PROCEDURE — 82607 VITAMIN B-12: CPT | Performed by: INTERNAL MEDICINE

## 2025-08-20 PROCEDURE — 80061 LIPID PANEL: CPT | Performed by: INTERNAL MEDICINE

## 2025-08-20 PROCEDURE — 82306 VITAMIN D 25 HYDROXY: CPT

## 2025-08-20 PROCEDURE — 85025 COMPLETE CBC W/AUTO DIFF WBC: CPT | Performed by: INTERNAL MEDICINE

## 2025-08-20 PROCEDURE — 36415 COLL VENOUS BLD VENIPUNCTURE: CPT

## 2025-08-20 PROCEDURE — 80053 COMPREHEN METABOLIC PANEL: CPT | Performed by: INTERNAL MEDICINE

## 2025-08-26 ENCOUNTER — TELEPHONE (OUTPATIENT)
Dept: FAMILY MEDICINE CLINIC | Facility: CLINIC | Age: 65
End: 2025-08-26

## 2025-08-26 PROCEDURE — 87624 HPV HI-RISK TYP POOLED RSLT: CPT | Performed by: INTERNAL MEDICINE

## (undated) NOTE — ED AVS SNAPSHOT
Otoniel Christopher   MRN: [id number]    Department:  [facility] Emergency Department   Date of Visit:  2/5/2018           Disclosure     Insurance plans vary and the physician(s) referred by the ER may not be covered by your plan.  Please contact your tell this physician (or your personal doctor if your instructions are to return to your personal doctor) about any new or lasting problems. The primary care or specialist physician will see patients referred from the [facility] Emergency Department.  Jesus Preston